# Patient Record
Sex: MALE | Race: WHITE | NOT HISPANIC OR LATINO | Employment: OTHER | URBAN - METROPOLITAN AREA
[De-identification: names, ages, dates, MRNs, and addresses within clinical notes are randomized per-mention and may not be internally consistent; named-entity substitution may affect disease eponyms.]

---

## 2017-01-20 ENCOUNTER — HOSPITAL ENCOUNTER (EMERGENCY)
Facility: CLINIC | Age: 67
Discharge: HOME | End: 2017-01-20
Attending: EMERGENCY MEDICINE | Admitting: EMERGENCY MEDICINE
Payer: MEDICARE

## 2017-01-20 ENCOUNTER — APPOINTMENT (OUTPATIENT)
Dept: CT IMAGING | Facility: CLINIC | Age: 67
End: 2017-01-20
Attending: EMERGENCY MEDICINE
Payer: MEDICARE

## 2017-01-20 VITALS
OXYGEN SATURATION: 97 % | RESPIRATION RATE: 16 BRPM | TEMPERATURE: 98.1 F | SYSTOLIC BLOOD PRESSURE: 114 MMHG | WEIGHT: 137.79 LBS | DIASTOLIC BLOOD PRESSURE: 69 MMHG

## 2017-01-20 DIAGNOSIS — G45.9 TRANSIENT CEREBRAL ISCHEMIA, UNSPECIFIED TYPE: ICD-10-CM

## 2017-01-20 LAB
ANION GAP SERPL CALCULATED.3IONS-SCNC: 8 MMOL/L (ref 3–14)
BASOPHILS # BLD AUTO: 0 10E9/L (ref 0–0.2)
BASOPHILS NFR BLD AUTO: 0.4 %
BUN SERPL-MCNC: 19 MG/DL (ref 7–30)
CALCIUM SERPL-MCNC: 9.1 MG/DL (ref 8.5–10.1)
CHLORIDE SERPL-SCNC: 105 MMOL/L (ref 94–109)
CO2 SERPL-SCNC: 28 MMOL/L (ref 20–32)
CREAT SERPL-MCNC: 0.99 MG/DL (ref 0.66–1.25)
DIFFERENTIAL METHOD BLD: NORMAL
EOSINOPHIL # BLD AUTO: 0.6 10E9/L (ref 0–0.7)
EOSINOPHIL NFR BLD AUTO: 6.3 %
ERYTHROCYTE [DISTWIDTH] IN BLOOD BY AUTOMATED COUNT: 12.3 % (ref 10–15)
GFR SERPL CREATININE-BSD FRML MDRD: 75 ML/MIN/1.7M2
GLUCOSE SERPL-MCNC: 123 MG/DL (ref 70–99)
HCT VFR BLD AUTO: 46.9 % (ref 40–53)
HGB BLD-MCNC: 15.8 G/DL (ref 13.3–17.7)
IMM GRANULOCYTES # BLD: 0 10E9/L (ref 0–0.4)
IMM GRANULOCYTES NFR BLD: 0.1 %
INTERPRETATION ECG - MUSE: NORMAL
LYMPHOCYTES # BLD AUTO: 3.2 10E9/L (ref 0.8–5.3)
LYMPHOCYTES NFR BLD AUTO: 33.6 %
MCH RBC QN AUTO: 31 PG (ref 26.5–33)
MCHC RBC AUTO-ENTMCNC: 33.7 G/DL (ref 31.5–36.5)
MCV RBC AUTO: 92 FL (ref 78–100)
MONOCYTES # BLD AUTO: 0.9 10E9/L (ref 0–1.3)
MONOCYTES NFR BLD AUTO: 9.2 %
NEUTROPHILS # BLD AUTO: 4.7 10E9/L (ref 1.6–8.3)
NEUTROPHILS NFR BLD AUTO: 50.4 %
NRBC # BLD AUTO: 0 10*3/UL
NRBC BLD AUTO-RTO: 0 /100
PLATELET # BLD AUTO: 212 10E9/L (ref 150–450)
POTASSIUM SERPL-SCNC: 3.7 MMOL/L (ref 3.4–5.3)
RBC # BLD AUTO: 5.1 10E12/L (ref 4.4–5.9)
SODIUM SERPL-SCNC: 141 MMOL/L (ref 133–144)
TROPONIN I SERPL-MCNC: NORMAL UG/L (ref 0–0.04)
WBC # BLD AUTO: 9.4 10E9/L (ref 4–11)

## 2017-01-20 PROCEDURE — 25000125 ZZHC RX 250: Performed by: EMERGENCY MEDICINE

## 2017-01-20 PROCEDURE — 70450 CT HEAD/BRAIN W/O DYE: CPT

## 2017-01-20 PROCEDURE — 70498 CT ANGIOGRAPHY NECK: CPT

## 2017-01-20 PROCEDURE — 25000128 H RX IP 250 OP 636: Performed by: EMERGENCY MEDICINE

## 2017-01-20 PROCEDURE — 96375 TX/PRO/DX INJ NEW DRUG ADDON: CPT | Mod: 59

## 2017-01-20 PROCEDURE — 25500064 ZZH RX 255 OP 636: Performed by: EMERGENCY MEDICINE

## 2017-01-20 PROCEDURE — 85025 COMPLETE CBC W/AUTO DIFF WBC: CPT | Performed by: EMERGENCY MEDICINE

## 2017-01-20 PROCEDURE — 99285 EMERGENCY DEPT VISIT HI MDM: CPT | Mod: 25

## 2017-01-20 PROCEDURE — 84484 ASSAY OF TROPONIN QUANT: CPT | Performed by: EMERGENCY MEDICINE

## 2017-01-20 PROCEDURE — 80048 BASIC METABOLIC PNL TOTAL CA: CPT | Performed by: EMERGENCY MEDICINE

## 2017-01-20 PROCEDURE — 93005 ELECTROCARDIOGRAM TRACING: CPT

## 2017-01-20 PROCEDURE — 96374 THER/PROPH/DIAG INJ IV PUSH: CPT | Mod: 59

## 2017-01-20 RX ORDER — IOPAMIDOL 755 MG/ML
500 INJECTION, SOLUTION INTRAVASCULAR ONCE
Status: COMPLETED | OUTPATIENT
Start: 2017-01-20 | End: 2017-01-20

## 2017-01-20 RX ORDER — METHYLPREDNISOLONE SODIUM SUCCINATE 125 MG/2ML
125 INJECTION, POWDER, LYOPHILIZED, FOR SOLUTION INTRAMUSCULAR; INTRAVENOUS ONCE
Status: COMPLETED | OUTPATIENT
Start: 2017-01-20 | End: 2017-01-20

## 2017-01-20 RX ORDER — DIPHENHYDRAMINE HYDROCHLORIDE 50 MG/ML
25 INJECTION INTRAMUSCULAR; INTRAVENOUS ONCE
Status: COMPLETED | OUTPATIENT
Start: 2017-01-20 | End: 2017-01-20

## 2017-01-20 RX ADMIN — METHYLPREDNISOLONE SODIUM SUCCINATE 125 MG: 125 INJECTION, POWDER, FOR SOLUTION INTRAMUSCULAR; INTRAVENOUS at 04:51

## 2017-01-20 RX ADMIN — IOPAMIDOL 70 ML: 755 INJECTION, SOLUTION INTRAVENOUS at 05:34

## 2017-01-20 RX ADMIN — DIPHENHYDRAMINE HYDROCHLORIDE 25 MG: 50 INJECTION, SOLUTION INTRAMUSCULAR; INTRAVENOUS at 04:50

## 2017-01-20 RX ADMIN — SODIUM CHLORIDE 80 ML: 9 INJECTION, SOLUTION INTRAVENOUS at 05:34

## 2017-01-20 ASSESSMENT — ENCOUNTER SYMPTOMS
NAUSEA: 1
NERVOUS/ANXIOUS: 1
WEAKNESS: 0
NUMBNESS: 0

## 2017-01-20 NOTE — ED AVS SNAPSHOT
[facility] Emergency Department    [address] [place] [address]    Phone:  [phone]    Fax:  [phone]                                       Smion Koch   MRN: [id number]    Department:  [facility] Emergency Department   Date of Visit:  1/20/2017           Patient Information     Date Of Birth          1950        Your diagnoses for this visit were:     Transient cerebral ischemia, unspecified type        You were seen by Antonio Ruelas MD.      Follow-up Information     Follow up with Aniyah Hernandez. Call today.    Specialty:  Internal Medicine    Why:  call today to discuss follow-up appointment    Contact information:    [facility]  [address] [place] [address]  [phone]          Follow up with [facility] Emergency Department.    Specialty:  EMERGENCY MEDICINE    Why:  As needed, If symptoms worsen    Contact information:    [address] [place] [address]  [phone]        Discharge Instructions         TIA: Transient Ischemic Attack    Your symptoms were caused by a TIA, also called a mini-stroke. Even though your symptoms have gone away, this condition is as serious as a full stroke. It means you are more likely to have a full stroke. About one-third of people who have a TIA go on to have a full stroke. And, 4% to 10% will have a full stroke within 2 days.  TIA is caused by a temporary decrease or blockage of blood flow to a part of your brain. A TIA often happens when a blood clot travels to a blood vessel in the brain. The clot reduces or blocks blood flow, resulting in the symptoms you had. After a short while, the clot dissolves, blood flows again, and the symptoms disappear. Persons with atherosclerosis (hardening of the arteries) or atrial fibrillation (a type of irregular heartbeat) are at higher risk of TIA.  TIA causes temporary symptoms similar to a stroke, but lasts  less than 24 hours. A full stroke causes symptoms that last more than 24 hours and may be permanent. However, even if your symptoms only lasted a short time, there may still be some damage to your brain tissue. Once you have had a TIA, you are at risk of having a full stroke. Therefore, you have received a workup to assess the blood flow to your brain and to rule out other causes of your symptoms. The workup may have included an ultrasound of the arteries in your neck, an evaluation of your heart, a CT scan, and/or an MRI scan. If problems are found, your doctor will recommend treatment with medications and/or procedures.  Medications to reduce your chance of having another TIA (and stroke) include those that prevent blood clots, such as antiplatelet medicines and anticoagulant medicines. Depending on your situation, additional treatment may be recommended, including a procedure to open up a  blockage in an artery in your neck.  Home care  The following guidelines will help you take care of yourself at home:    If your doctor has prescribed antiplatelet medication and/or medications for other conditions, such as high blood pressure, or high cholesterol, take it as directed.    Because a TIA is a serious even that means you are at risk for having a full stroke in the future, it is important to take steps to help prevent a stroke from occurring. Your doctor will consider all your risk factors in recommending management and further treatment.  Ways to reduce your risk for stroke  High blood pressure, diabetes, high cholesterol, heavy alcohol intake, and smoking are risk factors for stroke and heart disease. These can be controlled through medications, diet and lifestyle changes. Taking daily aspirin (or similar medications) is a simple but important part of preventing stroke. Do not take daily aspirin unless your doctor tells you to.  Your health care provider will work with you to make lifestyle changes to help  prevent the stroke.    Diet. Your health care provider will give you information on dietary changes that you may need to make, based on your situation. Your provider may recommend that you see a registered dietitian for help with diet changes. Changes may include:    Reducing fat and cholesterol intake    Reducing sodium (salt) intake, especially if you have high blood pressure    Eating more fresh fruits and vegetables    Eating lean proteins, such as fish, poultry, and legumes (beans and peas) and eating less red meat and processed meats    Using low-fat dairy products    Using vegetable and nut oils in limited amounts    Limiting sweets and processed foods such as chips, cookies, and baked goods    Limiting alcohol intake    Physical activity. Your health care provider may recommend that you increase your physical activity if you have not been as active as possible. Depending on your situation, your provider may recommend that you include moderate to vigorous intensity physical activity for at least 40 minutes each day for at least 3 to 4 days per week. A few examples of moderate to vigorous intensity physical activity include:    Walking at a brisk pace, about 3 to 4 miles per hour    Jogging or running    Swimming or water aerobics    Hiking    Dancing    Martial arts    Tennis    Riding a bike    Weight management. If you are overweight or obese, your health care provider will work with you to lose weight and lower your body mass index (BMI) to a  normal or near-normal levle. Making diet changes and increasing physical activity can help.    Smoking. If you smoke, break the habit. Enroll in a stop smoking program to improve your chances of success.    Stress. Learn stress management techniques to help you deal with stress in your home and work life.  Follow-up care  Call your doctor for an appointment in the next few days for another evaluation (if needed) and to make a plan for preventing a future TIA or  stroke. You may be referred to a neurologist (specialist in brain and nervous system diagnosis and care) to follow up on your TIA.. Additional tests or procedures may be needed. If you had an X-ray, CT scan, MRI scan, or ECG (electrocardiogram), it will be reviewed by a specialist. You ll be notified of any new findings that will affect your care.  When to call 911 or emergency services  Get immediate medical attention if any of the following occur:    Any of your TIA symptoms return    New problems with speech, vision, walking, or weakness or numbness of the face or on one side of the body    Severe headache, fainting spell, dizziness, or seizure    5753-7966 [employer]. [address] [place] [address], [place] [address]. All rights reserved. This information is not intended as a substitute for professional medical care. Always follow your healthcare professional's instructions.          24 Hour Appointment Hotline       To make an appointment at any [facility], call [phone] ([phone]). If you don't have a family doctor or clinic, we will help you find one. [facility] clinics are conveniently located to serve the needs of you and your family.             Review of your medicines      Our records show that you are taking the medicines listed below. If these are incorrect, please call your family doctor or clinic.        Dose / Directions Last dose taken    ALTACE PO        Refills:  0        ASPIRIN PO   Dose:  81 mg        Take 81 mg by mouth   Refills:  0        CRESTOR PO   Dose:  20 mg        Take 20 mg by mouth   Refills:  0        VITAMIN D (CHOLECALCIFEROL) PO        Take by mouth daily   Refills:  0                Procedures and tests performed during your visit     Basic metabolic panel    CBC with platelets differential    EKG 12 lead    Head CT w/o contrast    Head/neck angiogram CT  w & w/o contrast    Troponin I      Orders Needing Specimen Collection     None      Pending Results      Date and Time Order Name Status Description    1/20/2017 0438 Head/neck angiogram CT  w & w/o contrast In process     1/20/2017 0438 Head CT w/o contrast In process             Pending Culture Results     No orders found from 1/19/2017 to 1/21/2017.       Test Results from your hospital stay           1/20/2017  4:49 AM - Interface, Flexilab Results      Component Results     Component Value Ref Range & Units Status    WBC 9.4 4.0 - 11.0 10e9/L Final    RBC Count 5.10 4.4 - 5.9 10e12/L Final    Hemoglobin 15.8 13.3 - 17.7 g/dL Final    Hematocrit 46.9 40.0 - 53.0 % Final    MCV 92 78 - 100 fl Final    MCH 31.0 26.5 - 33.0 pg Final    MCHC 33.7 31.5 - 36.5 g/dL Final    RDW 12.3 10.0 - 15.0 % Final    Platelet Count 212 150 - 450 10e9/L Final    Diff Method Automated Method  Final    % Neutrophils 50.4 % Final    % Lymphocytes 33.6 % Final    % Monocytes 9.2 % Final    % Eosinophils 6.3 % Final    % Basophils 0.4 % Final    % Immature Granulocytes 0.1 % Final    Nucleated RBCs 0 0 /100 Final    Absolute Neutrophil 4.7 1.6 - 8.3 10e9/L Final    Absolute Lymphocytes 3.2 0.8 - 5.3 10e9/L Final    Absolute Monocytes 0.9 0.0 - 1.3 10e9/L Final    Absolute Eosinophils 0.6 0.0 - 0.7 10e9/L Final    Absolute Basophils 0.0 0.0 - 0.2 10e9/L Final    Abs Immature Granulocytes 0.0 0 - 0.4 10e9/L Final    Absolute Nucleated RBC 0.0  Final         1/20/2017  5:06 AM - Interface, Flexilab Results      Component Results     Component Value Ref Range & Units Status    Sodium 141 133 - 144 mmol/L Final    Potassium 3.7 3.4 - 5.3 mmol/L Final    Chloride 105 94 - 109 mmol/L Final    Carbon Dioxide 28 20 - 32 mmol/L Final    Anion Gap 8 3 - 14 mmol/L Final    Glucose 123 (H) 70 - 99 mg/dL Final    Urea Nitrogen 19 7 - 30 mg/dL Final    Creatinine 0.99 0.66 - 1.25 mg/dL Final    GFR Estimate 75 >60 mL/min/1.7m2 Final    Non  GFR Calc    GFR Estimate If Black >90   GFR Calc   >60 mL/min/1.7m2 Final     Calcium 9.1 8.5 - 10.1 mg/dL Final         1/20/2017  5:14 AM - Muna Monaco         1/20/2017  5:14 AM - OneliaMuna reeves         1/20/2017  5:06 AM - Rosanne, Flexilab Results      Component Results     Component Value Ref Range & Units Status    Troponin I ES  0.000 - 0.045 ug/L Final    <0.015  The 99th percentile for upper reference range is 0.045 ug/L.  Troponin values in   the range of 0.045 - 0.120 ug/L may be associated with risks of adverse   clinical events.                  Clinical Quality Measure: Blood Pressure Screening     Your blood pressure was checked while you were in the emergency department today. The last reading we obtained was  BP: 114/69 mmHg . Please read the guidelines below about what these numbers mean and what you should do about them.  If your systolic blood pressure (the top number) is less than 120 and your diastolic blood pressure (the bottom number) is less than 80, then your blood pressure is normal. There is nothing more that you need to do about it.  If your systolic blood pressure (the top number) is 120-139 or your diastolic blood pressure (the bottom number) is 80-89, your blood pressure may be higher than it should be. You should have your blood pressure rechecked within a year by a primary care provider.  If your systolic blood pressure (the top number) is 140 or greater or your diastolic blood pressure (the bottom number) is 90 or greater, you may have high blood pressure. High blood pressure is treatable, but if left untreated over time it can put you at risk for heart attack, stroke, or kidney failure. You should have your blood pressure rechecked by a primary care provider within the next 4 weeks.  If your provider in the emergency department today gave you specific instructions to follow-up with your doctor or provider even sooner than that, you should follow that instruction and not wait for up to 4 weeks for your follow-up visit.        Thank you  "for choosing [facility]       Thank you for choosing [facility] for your care. Our goal is always to provide you with excellent care. Hearing back from our patients is one way we can continue to improve our services. Please take a few minutes to complete the written survey that you may receive in the mail after you visit with us. Thank you!        [employer] Information     [facility] lets you send messages to your doctor, view your test results, renew your prescriptions, schedule appointments and more. To sign up, go to [email or web address] . Click on \"Log in\" on the left side of the screen, which will take you to the Welcome page. Then click on \"Sign up Now\" on the right side of the page.     You will be asked to enter the access code listed below, as well as some personal information. Please follow the directions to create your username and password.     Your access code is: [email or web address]  Expires: 2017  6:55 AM     Your access code will  in 90 days. If you need help or a new code, please call your [facility] clinic or [phone].        Care EveryWhere ID     This is your Care EveryWhere ID. This could be used by other organizations to access your [facility] medical records  [id number]        After Visit Summary       This is your record. Keep this with you and show to your community pharmacist(s) and doctor(s) at your next visit.                  "

## 2017-01-20 NOTE — DISCHARGE INSTRUCTIONS
TIA: Transient Ischemic Attack    Your symptoms were caused by a TIA, also called a mini-stroke. Even though your symptoms have gone away, this condition is as serious as a full stroke. It means you are more likely to have a full stroke. About one-third of people who have a TIA go on to have a full stroke. And, 4% to 10% will have a full stroke within 2 days.  TIA is caused by a temporary decrease or blockage of blood flow to a part of your brain. A TIA often happens when a blood clot travels to a blood vessel in the brain. The clot reduces or blocks blood flow, resulting in the symptoms you had. After a short while, the clot dissolves, blood flows again, and the symptoms disappear. Persons with atherosclerosis (hardening of the arteries) or atrial fibrillation (a type of irregular heartbeat) are at higher risk of TIA.  TIA causes temporary symptoms similar to a stroke, but lasts less than 24 hours. A full stroke causes symptoms that last more than 24 hours and may be permanent. However, even if your symptoms only lasted a short time, there may still be some damage to your brain tissue. Once you have had a TIA, you are at risk of having a full stroke. Therefore, you have received a workup to assess the blood flow to your brain and to rule out other causes of your symptoms. The workup may have included an ultrasound of the arteries in your neck, an evaluation of your heart, a CT scan, and/or an MRI scan. If problems are found, your doctor will recommend treatment with medications and/or procedures.  Medications to reduce your chance of having another TIA (and stroke) include those that prevent blood clots, such as antiplatelet medicines and anticoagulant medicines. Depending on your situation, additional treatment may be recommended, including a procedure to open up a  blockage in an artery in your neck.  Home care  The following guidelines will help you take care of yourself at home:    If your doctor has  prescribed antiplatelet medication and/or medications for other conditions, such as high blood pressure, or high cholesterol, take it as directed.    Because a TIA is a serious even that means you are at risk for having a full stroke in the future, it is important to take steps to help prevent a stroke from occurring. Your doctor will consider all your risk factors in recommending management and further treatment.  Ways to reduce your risk for stroke  High blood pressure, diabetes, high cholesterol, heavy alcohol intake, and smoking are risk factors for stroke and heart disease. These can be controlled through medications, diet and lifestyle changes. Taking daily aspirin (or similar medications) is a simple but important part of preventing stroke. Do not take daily aspirin unless your doctor tells you to.  Your health care provider will work with you to make lifestyle changes to help prevent the stroke.    Diet. Your health care provider will give you information on dietary changes that you may need to make, based on your situation. Your provider may recommend that you see a registered dietitian for help with diet changes. Changes may include:    Reducing fat and cholesterol intake    Reducing sodium (salt) intake, especially if you have high blood pressure    Eating more fresh fruits and vegetables    Eating lean proteins, such as fish, poultry, and legumes (beans and peas) and eating less red meat and processed meats    Using low-fat dairy products    Using vegetable and nut oils in limited amounts    Limiting sweets and processed foods such as chips, cookies, and baked goods    Limiting alcohol intake    Physical activity. Your health care provider may recommend that you increase your physical activity if you have not been as active as possible. Depending on your situation, your provider may recommend that you include moderate to vigorous intensity physical activity for at least 40 minutes each day for at least 3  to 4 days per week. A few examples of moderate to vigorous intensity physical activity include:    Walking at a brisk pace, about 3 to 4 miles per hour    Jogging or running    Swimming or water aerobics    Hiking    Dancing    Martial arts    Tennis    Riding a bike    Weight management. If you are overweight or obese, your health care provider will work with you to lose weight and lower your body mass index (BMI) to a  normal or near-normal levle. Making diet changes and increasing physical activity can help.    Smoking. If you smoke, break the habit. Enroll in a stop smoking program to improve your chances of success.    Stress. Learn stress management techniques to help you deal with stress in your home and work life.  Follow-up care  Call your doctor for an appointment in the next few days for another evaluation (if needed) and to make a plan for preventing a future TIA or stroke. You may be referred to a neurologist (specialist in brain and nervous system diagnosis and care) to follow up on your TIA.. Additional tests or procedures may be needed. If you had an X-ray, CT scan, MRI scan, or ECG (electrocardiogram), it will be reviewed by a specialist. You ll be notified of any new findings that will affect your care.  When to call 911 or emergency services  Get immediate medical attention if any of the following occur:    Any of your TIA symptoms return    New problems with speech, vision, walking, or weakness or numbness of the face or on one side of the body    Severe headache, fainting spell, dizziness, or seizure    2487-0286 [employer]. [address] [place] [address], [place] [address]. All rights reserved. This information is not intended as a substitute for professional medical care. Always follow your healthcare professional's instructions.

## 2017-01-20 NOTE — ED AVS SNAPSHOT
[facility] Emergency Department    [address]    [facility] [address]    Phone:  [phone]    Fax:  [phone]                                       Simon Koch   MRN: [id number]    Department:  [facility] Emergency Department   Date of Visit:  1/20/2017           After Visit Summary Signature Page     I have received my discharge instructions, and my questions have been answered. I have discussed any challenges I see with this plan with the nurse or doctor.    ..........................................................................................................................................  Patient/Patient Representative Signature      ..........................................................................................................................................  Patient Representative Print Name and Relationship to Patient    ..................................................               ................................................  Date                                            Time    ..........................................................................................................................................  Reviewed by Signature/Title    ...................................................              ..............................................  Date                                                            Time

## 2017-01-20 NOTE — ED NOTES
Alert and oriented x 3 airway,breathing and circulation intact, got up one houjr ago to go to the BR and felt himself leaning to the rt

## 2017-01-20 NOTE — LETTER
[facility] EMERGENCY DEPARTMENT  [address]  [facility] [address]  [phone]    2017    Simon Koch  [address] [facility] [address]  [phone] (home) none (work)    : 1950      To Whom it may concern:    Simon Koch was seen in our Emergency Department today, 2017.  We recommend that patient not fly today. Please allow cancellation of his and his wife's flight.     Sincerely,        Antonio Ruelas MD

## 2017-01-20 NOTE — ED PROVIDER NOTES
History     Chief Complaint:  Gait instabililty    HPI   Simon Koch is a 66 year old male with a history of bradycardia who presents with spouse to the emergency department today for evaluation of gait instability. Mr. Koch reports at 03:15am, an hour prior, he attempted to walk to the bathroom when he began to lean to the right side with multiple episodes lasting 10 minutes. Last known normal is 10:30. Patient began to feel anxious and nauseas. He was worried and came in to be evaluated. Here, he reports improvement in symptoms, but continues to feel anxious. Patient is an avid runner and ran a marathon last year. No reported shortness of breath. Patient is scheduled to go to [place] later today.     CARDIAC RISK FACTORS  SEX:                  Male  Tobacco:             Negative  Hypertension:        Positive  Diabetes:             Negative  Lipids:                Positive  Personal History:  Negative  Family History:       Negative    Allergies:  Percocet  Vicodin  Penicillins  Dilaudid   Iodine    Medications:    Crestor  Vitamin D  Ramipril  Aspirin        Past Medical History:    Allergic rhinitis   External hemorrhoid   Vitamin D deficiency   Plantar fasciitis   Coronary atherosclerosis of native coronary artery    Benign neoplasm of colon   Essential hypertension  Bradycardia   Mixed hyperlipidemia     Past Surgical History:    Surgical history reviewed. No pertinent surgical history.     Family History:    History reviewed. No pertinent family history.    Social History:  Marital Status:   [2]  Tobacco: Negative  Alcohol: Negative  Presents with spouse.     Review of Systems   Gastrointestinal: Positive for nausea.   Neurological: Negative for weakness and numbness.        Positive for gait instability.    Psychiatric/Behavioral: The patient is nervous/anxious.    All other systems reviewed and are negative.    Physical Exam   First Vitals:  BP: 156/88 mmHg  Heart Rate: 68  Temp: 98.1  F (36.7   C)  Resp: 18  Weight: 62.5 kg (137 lb 12.6 oz)  SpO2: 99 %    Physical Exam   Nursing note and vitals reviewed.  Constitutional: Cooperative.   HENT:   Mouth/Throat: Moist mucous membranes.   Eyes: EOMI, nonicteric sclera  Cardiovascular: Normal rate, regular rhythm, no murmurs, rubs, or gallops  Pulmonary/Chest: Effort normal and breath sounds normal. No respiratory distress. No wheezes. No rales.   Abdominal: Soft. Nontender, nondistended, no guarding or rigidity. BS present.   Musculoskeletal: Normal range of motion.   Neurological: Alert. Moves all extremities spontaneously.     CN's II-XII intact. 5/5 BUE and BLE strength. PERRL    EOMI without nystagmus.      Sensation intact to light touch. Negative pronator drift.    Finger to nose intact. Negative Romberg. Normal gait.     Skin: Skin is warm and dry. No rash noted.   Psychiatric: Normal mood and affect.     Emergency Department Course   EC  Indication: rule out cardiac etiology  Findings:    Normal sinus rhythm. Normal ECG.   Ventricular rate: 63 bpm  DE interval: 190 ms  QRS duration: 96 ms  QT/QTc: 436/446 ms  R-Axis: 36  ECG read by Dr. Ruelas.    Imaging:  Radiographic findings were communicated with the patient and family who voiced understanding of the findings.  Head CT, without contrast, per radiology:   Normal CT scan of the head.    Head/Neck CTA, with and without contrast, per radiology:   1. No arterial occlusion or thrombus.  2. Mild stenosis distal petrous segment of the right internal carotid artery.  3. Minimal calcified plaque proximal left internal carotid artery.    Laboratory:  CBC:  WBC 9.4, HGB 15.8, , otherwise WNL  BMP: Glucose 123 (H) o/w WNL. (Creatinine 0.99)  Troponin collected at 0429: <0.015 (WNL)          Interventions:  0450 Benadryl 25 mg IV  0451 Solu-Medrol 125mg IV    Emergency Department Course:  Nursing notes and vitals reviewed. Code stroke called.   04:18 I performed an exam of the patient as  documented above.   The above workup was undertaken.  I rechecked the patient and discussed results.  Findings and plan explained to the Patient and spouse. Patient discharged home with instructions regarding supportive care, medications, and reasons to return. The importance of close follow-up was reviewed.     Impression & Plan      CMS Diagnoses: The patient has stroke symptoms:           ED Stroke specific documentation           NIHSS PDF          Protocol PDF     Patient last known well time: 10:30  ED Provider first to bedside at: 04:18    Patient was not treated with TPA due to the following reason(s):  Out of the treatment time window  Mild stroke symptoms ( NIHSS < 4 and not globally aphasic)  Isolated mild neurological deficits such as ataxia alone, sensory loss alone, dysarthria alone or minimal weakness ( NIHSS < 4 AND normal language AND visual fields )  Rapidly improving symptoms    National Institutes of Health Stroke Scale (Baseline)  Time Performed: 0420      Score    Level of consciousness: (0)   Alert, keenly responsive    LOC questions: (0)   Answers both questions correctly    LOC commands: (0)   Performs both tasks correctly    Best gaze: (0)   Normal    Visual: (0)   No visual loss    Facial palsy: (0)   Normal symmetrical movements    Motor arm (left): (0)   No drift    Motor arm (right): (0)   No drift    Motor leg (left): (0)   No drift    Motor leg (right): (0)   No drift    Limb ataxia: (0)   Absent    Sensory: (0)   Normal- no sensory loss    Best language: (0)   Normal- no aphasia    Dysarthria: (0)   Normal    Extinction and inattention: (0)   No abnormality        Total Score:  0        Stroke Mimics were considered (including migraine headache, seizure disorder, hypoglycemia (or hyperglycemia), head or spinal trauma, CNS infection, Toxin ingestion and shock state (e.g. sepsis) .    Medical Decision Making:  Simon Koch is a 66 year old male who presents with complaint of some  mild ataxia at home. This has resolved before arrival here. The remainder of the patient's neurologic exam is completely normal and without abnormality. The patient was ambulated multiple times here in the ED and no ataxia was observed. The patient's labs were unremarkable. EKG was normal. CT and CTA of the head and neck also appear normal. Uncertain etiology of patient's complaints at this time, however, I suspect that this represented a mild TIA. By ABCD 2 Score, the patient is low risk, which I explained to them at bedside. I recommended outpatient follow up with his PCP for further outpatient stroke evaluation. The patient stated that he is supposed to go out of town to [place] today, and while I think he would more than likely be okay, I did recommend against going out of town, given the possibility that this had represented a TIA. I had an extensive discussion with the patient and his wife at bedside concerning the course of the next few days and reasons to return to the ED. I doubt other etiology of ataxia given normal imaging and labs. There is no alcohol on board. The patient never had symptoms like this in the past. He is in stable condition at the time of discharge, indications for return to the ED were discussed as well as follow up. All questions were answered and He is in agreement with the plan.    Diagnosis:    ICD-10-CM   1. Transient cerebral ischemia, unspecified type G45.9     Disposition:  Discharge to home with primary care follow up.         Scribe Disclosure:  Marcela ROBLES, am serving as a scribe at 4:18 AM on 1/20/2017 to document services personally performed by Antonio Ruelas MD, based on my observations and the provider's statements to me.  Marcela Rivers  1/20/2017   [facility] EMERGENCY DEPARTMENT        Antonio Ruelas MD  01/25/17 0820

## 2017-10-07 ENCOUNTER — HOSPITAL ENCOUNTER (EMERGENCY)
Facility: CLINIC | Age: 67
Discharge: HOME | End: 2017-10-07
Attending: EMERGENCY MEDICINE | Admitting: EMERGENCY MEDICINE
Payer: MEDICARE

## 2017-10-07 VITALS
DIASTOLIC BLOOD PRESSURE: 80 MMHG | WEIGHT: 135 LBS | TEMPERATURE: 97.6 F | BODY MASS INDEX: 21.69 KG/M2 | OXYGEN SATURATION: 100 % | HEIGHT: 66 IN | RESPIRATION RATE: 16 BRPM | SYSTOLIC BLOOD PRESSURE: 118 MMHG | HEART RATE: 59 BPM

## 2017-10-07 DIAGNOSIS — H81.12 BENIGN PAROXYSMAL POSITIONAL VERTIGO OF LEFT EAR: ICD-10-CM

## 2017-10-07 PROCEDURE — 25000132 ZZH RX MED GY IP 250 OP 250 PS 637: Mod: GY | Performed by: EMERGENCY MEDICINE

## 2017-10-07 PROCEDURE — 93005 ELECTROCARDIOGRAM TRACING: CPT

## 2017-10-07 PROCEDURE — 25000131 ZZH RX MED GY IP 250 OP 636 PS 637: Performed by: EMERGENCY MEDICINE

## 2017-10-07 PROCEDURE — 25000125 ZZHC RX 250: Performed by: EMERGENCY MEDICINE

## 2017-10-07 PROCEDURE — A9270 NON-COVERED ITEM OR SERVICE: HCPCS | Mod: GY | Performed by: EMERGENCY MEDICINE

## 2017-10-07 PROCEDURE — 99283 EMERGENCY DEPT VISIT LOW MDM: CPT

## 2017-10-07 RX ORDER — LORAZEPAM 0.5 MG/1
0.5 TABLET ORAL ONCE
Status: COMPLETED | OUTPATIENT
Start: 2017-10-07 | End: 2017-10-07

## 2017-10-07 RX ORDER — MECLIZINE HYDROCHLORIDE 25 MG/1
50 TABLET ORAL ONCE
Status: COMPLETED | OUTPATIENT
Start: 2017-10-07 | End: 2017-10-07

## 2017-10-07 RX ORDER — LORAZEPAM 1 MG/1
0.5 TABLET ORAL EVERY 6 HOURS PRN
Qty: 10 TABLET | Refills: 0 | Status: SHIPPED | OUTPATIENT
Start: 2017-10-07

## 2017-10-07 RX ORDER — MECLIZINE HYDROCHLORIDE 25 MG/1
25-50 TABLET ORAL 3 TIMES DAILY PRN
Qty: 30 TABLET | Refills: 1 | Status: SHIPPED | OUTPATIENT
Start: 2017-10-07

## 2017-10-07 RX ORDER — ONDANSETRON 4 MG/1
4 TABLET, ORALLY DISINTEGRATING ORAL EVERY 8 HOURS PRN
Qty: 10 TABLET | Refills: 0 | Status: SHIPPED | OUTPATIENT
Start: 2017-10-07 | End: 2017-10-10

## 2017-10-07 RX ORDER — ONDANSETRON 4 MG/1
4 TABLET, ORALLY DISINTEGRATING ORAL ONCE
Status: COMPLETED | OUTPATIENT
Start: 2017-10-07 | End: 2017-10-07

## 2017-10-07 RX ADMIN — MECLIZINE HYDROCHLORIDE 50 MG: 25 TABLET ORAL at 05:06

## 2017-10-07 RX ADMIN — LORAZEPAM 0.5 MG: 0.5 TABLET ORAL at 05:43

## 2017-10-07 RX ADMIN — ONDANSETRON 4 MG: 4 TABLET, ORALLY DISINTEGRATING ORAL at 05:41

## 2017-10-07 ASSESSMENT — ENCOUNTER SYMPTOMS
NECK PAIN: 0
WEAKNESS: 0
HEADACHES: 0
NAUSEA: 1
NUMBNESS: 0
DIZZINESS: 1
VOMITING: 1

## 2017-10-07 NOTE — ED AVS SNAPSHOT
[facility] Emergency Department    [address]    [facility] [address]    Phone:  [phone]    Fax:  [phone]                                       Simon Koch   MRN: [id number]    Department:  [facility] Emergency Department   Date of Visit:  10/7/2017           After Visit Summary Signature Page     I have received my discharge instructions, and my questions have been answered. I have discussed any challenges I see with this plan with the nurse or doctor.    ..........................................................................................................................................  Patient/Patient Representative Signature      ..........................................................................................................................................  Patient Representative Print Name and Relationship to Patient    ..................................................               ................................................  Date                                            Time    ..........................................................................................................................................  Reviewed by Signature/Title    ...................................................              ..............................................  Date                                                            Time

## 2017-10-07 NOTE — DISCHARGE INSTRUCTIONS
Please make an appointment to follow up with [facility] [phone] in 2-3 days if not improving.    Contact [facility] as this can also help the vertigo resolve faster        Benign Paroxysmal Positional Vertigo     Your health care provider may move your head in certain ways to treat your BPPV.     Benign paroxysmal positional vertigo (BPPV) is a problem with the inner ear. The inner ear contains the vestibular system. This system is what helps you keep your balance. BPPV causes a feeling of spinning. It is a common problem of the vestibular system.  Understanding the vestibular system  The vestibular system of the ear is made up of very tiny parts. They include the utricle, saccule, and semicircular canals. The utricle is a tiny organ that contains calcium crystals. In some people, the crystals can move into the semicircular canals. When this happens, the system no longer works as it should. This causes BPPV. Benign means it is not life-threatening. Paroxysmal means it happens suddenly. Positional means that it happens when you move your head. Vertigo is a feeling of spinning.  What causes BPPV?  Causes include injury to your head or neck. Other problems with the vestibular system may cause BPPV. In many people, the cause of BPPV is not known.  Symptoms of BPPV  You many have repeated feelings of spinning (vertigo). The vertigo usually lasts less than 1 minute. Some movements, suchas rolling over in bed, can bring on vertigo.  Diagnosing BPPV  Your primary health care provider may diagnose and treat your BPPV. Or you may see an ear, nose, and throat doctor (otolaryngologist). In some cases, you may see a nervous system doctor (neurologist).  The health care provider will ask about your symptoms and your medical history. He or she will examine you. You may have hearing and balance tests. As part of the exam, your health care provider may have you move your head and body in  certain ways. If you have BPPV, the movements can bring on vertigo. Your provider will also look for abnormal movements of your eyes. You may have other tests to check your vestibular or nervous systems.  Treatment for BPPV  Your health care provider may try to move the calcium crystals. This is done by having you move your head and neck in certain ways. This treatment is safe and often works well. You may also be told to do these movements at home. You may still have vertigo for a few weeks. Your health care provider will recheck your symptoms, usually in about a month. Special physical therapy may also be part of treatment. In rare cases surgery may be needed for BPPV that does not go away.     When to call the health care provider  Call your health care provider right away if you have any of these:    Symptoms that do not go away with treatment    Symptoms that get worse    New symptoms   Date Last Reviewed: 3/19/2015    2621-8515 [employer] [facility]. [address] [place] [address]. All rights reserved. This information is not intended as a substitute for professional medical care. Always follow your healthcare professional's instructions.            Meclizine tablets or capsules  What is this medicine?  MECLIZINE (MEK li zeen) is an antihistamine. It is used to prevent nausea, vomiting, or dizziness caused by motion sickness. It is also used to prevent and treat vertigo (extreme dizziness or a feeling that you or your surroundings are tilting or spinning around).  How should I use this medicine?  Take this medicine by mouth with a glass of water. Follow the directions on the prescription label. If you are using this medicine to prevent motion sickness, take the dose at least 1 hour before travel. If it upsets your stomach, take it with food or milk. Take your doses at regular intervals. Do not take your medicine more often than directed.  Talk to your pediatrician regarding the use of this  medicine in children. Special care may be needed.  What side effects may I notice from receiving this medicine?  Side effects that you should report to your doctor or health care professional as soon as possible:    feeling faint or lightheaded, falls    fast, irregular heartbeat  Side effects that usually do not require medical attention (report these to your doctor or health care professional if they continue or are bothersome):    constipation    headache    trouble passing urine or change in the amount of urine    trouble sleeping    upset stomach  What may interact with this medicine?  Do not take this medicine with any of the following medications:    MAOIs like Carbex, Eldepryl, Marplan, Nardil, and Parnate  This medicine may also interact with the following medications:    alcohol    antihistamines for allergy, cough and cold    certain medicines for anxiety or sleep    certain medicines for depression, like amitriptyline, fluoxetine, sertraline    certain medicines for seizures like phenobarbital, primidone    general anesthetics like halothane, isoflurane, methoxyflurane, propofol    local anesthetics like lidocaine, pramoxine, tetracaine    medicines that relax muscles for surgery    narcotic medicines for pain    phenothiazines like chlorpromazine, mesoridazine, prochlorperazine, thioridazine  What if I miss a dose?  If you miss a dose, take it as soon as you can. If it is almost time for your next dose, take only that dose. Do not take double or extra doses.  Where should I keep my medicine?  Keep out of the reach of children.  Store at room temperature between 15 and 30 degrees C (59 and 86 degrees F). Keep container tightly closed. Throw away any unused medicine after the expiration date.  What should I tell my health care provider before I take this medicine?  They need to know if you have any of these conditions:    glaucoma    lung or breathing disease, like asthma    problems urinating    prostate  disease    stomach or intestine problems    an unusual or allergic reaction to meclizine, other medicines, foods, dyes, or preservatives    pregnant or trying to get pregnant    breast-feeding  What should I watch for while using this medicine?  Tell your doctor or healthcare professional if your symptoms do not start to get better or if they get worse.  You may get drowsy or dizzy. Do not drive, use machinery, or do anything that needs mental alertness until you know how this medicine affects you. Do not stand or sit up quickly, especially if you are an older patient. This reduces the risk of dizzy or fainting spells. Alcohol may interfere with the effect of this medicine. Avoid alcoholic drinks.  Your mouth may get dry. Chewing sugarless gum or sucking hard candy, and drinking plenty of water may help. Contact your doctor if the problem does not go away or is severe.  This medicine may cause dry eyes and blurred vision. If you wear contact lenses you may feel some discomfort. Lubricating drops may help. See your eye doctor if the problem does not go away or is severe.  NOTE:This sheet is a summary. It may not cover all possible information. If you have questions about this medicine, talk to your doctor, pharmacist, or health care provider. Copyright  2017 [employer]         Patient Education    Dextrose, Ondansetron Hydrochloride Solution for injection    Ondansetron Hydrochloride Oral solution    Ondansetron Hydrochloride Oral tablet    Ondansetron Hydrochloride Solution for injection    Ondansetron Hydrochloride, Sodium Chloride Solution for injection    Ondansetron Oral disintegrating tablet    Ondansetron Oral dissolving film  Ondansetron Oral disintegrating tablet  What is this medicine?  ONDANSETRON (on DAN se zainab) is used to treat nausea and vomiting caused by chemotherapy. It is also used to prevent or treat nausea and vomiting after surgery.  This medicine may be used for other purposes; ask your  health care provider or pharmacist if you have questions.  What should I tell my health care provider before I take this medicine?  They need to know if you have any of these conditions:    heart disease    history of irregular heartbeat    liver disease    low levels of magnesium or potassium in the blood    an unusual or allergic reaction to ondansetron, granisetron, other medicines, foods, dyes, or preservatives    pregnant or trying to get pregnant    breast-feeding  How should I use this medicine?  These tablets are made to dissolve in the mouth. Do not try to push the tablet through the foil backing. With dry hands, peel away the foil backing and gently remove the tablet. Place the tablet in the mouth and allow it to dissolve, then swallow. While you may take these tablets with water, it is not necessary to do so.  Talk to your pediatrician regarding the use of this medicine in children. Special care may be needed.  Overdosage: If you think you have taken too much of this medicine contact a poison control center or emergency room at once.  NOTE: This medicine is only for you. Do not share this medicine with others.  What if I miss a dose?  If you miss a dose, take it as soon as you can. If it is almost time for your next dose, take only that dose. Do not take double or extra doses.  What may interact with this medicine?  Do not take this medicine with any of the following medications:    apomorphine    certain medicines for fungal infections like fluconazole, itraconazole, ketoconazole, posaconazole, voriconazole    cisapride    dofetilide    dronedarone    pimozide    thioridazine    ziprasidone  This medicine may also interact with the following medications:    carbamazepine    certain medicines for depression, anxiety, or psychotic disturbances    fentanyl    linezolid    MAOIs like Carbex, Eldepryl, Marplan, Nardil, and Parnate    methylene blue (injected into a vein)    other medicines that prolong the QT  interval (cause an abnormal heart rhythm)    phenytoin    rifampicin    tramadol  This list may not describe all possible interactions. Give your health care provider a list of all the medicines, herbs, non-prescription drugs, or dietary supplements you use. Also tell them if you smoke, drink alcohol, or use illegal drugs. Some items may interact with your medicine.  What should I watch for while using this medicine?  Check with your doctor or health care professional as soon as you can if you have any sign of an allergic reaction.  What side effects may I notice from receiving this medicine?  Side effects that you should report to your doctor or health care professional as soon as possible:    allergic reactions like skin rash, itching or hives, swelling of the face, lips, or tongue    breathing problems    confusion    dizziness    fast or irregular heartbeat    feeling faint or lightheaded, falls    fever and chills    loss of balance or coordination    seizures    sweating    swelling of the hands and feet    tightness in the chest    tremors    unusually weak or tired  Side effects that usually do not require medical attention (report to your doctor or health care professional if they continue or are bothersome):    constipation or diarrhea    headache  This list may not describe all possible side effects. Call your doctor for medical advice about side effects. You may report side effects to FDA at 6-277-FDA-0310.  Where should I keep my medicine?  Keep out of the reach of children.  Store between 2 and 30 degrees C (36 and 86 degrees F). Throw away any unused medicine after the expiration date.  NOTE:This sheet is a summary. It may not cover all possible information. If you have questions about this medicine, talk to your doctor, pharmacist, or health care provider. Copyright  2016 [employer]         Patient Education    Lorazepam Oral solution    Lorazepam Oral tablet    Lorazepam Solution for  injection  Lorazepam Oral tablet  What is this medicine?  LORAZEPAM (hannah A ze adolph) is a benzodiazepine. It is used to treat anxiety.  This medicine may be used for other purposes; ask your health care provider or pharmacist if you have questions.  What should I tell my health care provider before I take this medicine?  They need to know if you have any of these conditions:    alcohol or drug abuse problem    bipolar disorder, depression, psychosis or other mental health condition    glaucoma    kidney or liver disease    lung disease or breathing difficulties    myasthenia gravis    Parkinson's disease    seizures or a history of seizures    suicidal thoughts    an unusual or allergic reaction to lorazepam, other benzodiazepines, foods, dyes, or preservatives    pregnant or trying to get pregnant    breast-feeding  How should I use this medicine?  Take this medicine by mouth with a glass of water. Follow the directions on the prescription label. If it upsets your stomach, take it with food or milk. Take your medicine at regular intervals. Do not take it more often than directed. Do not stop taking except on the advice of your doctor or health care professional.  Talk to your pediatrician regarding the use of this medicine in children. Special care may be needed.  Overdosage: If you think you have taken too much of this medicine contact a poison control center or emergency room at once.  NOTE: This medicine is only for you. Do not share this medicine with others.  What if I miss a dose?  If you miss a dose, take it as soon as you can. If it is almost time for your next dose, take only that dose. Do not take double or extra doses.  What may interact with this medicine?    barbiturate medicines for inducing sleep or treating seizures, like phenobarbital    clozapine    medicines for depression, mental problems or psychiatric disturbances    medicines for sleep    phenytoin    probenecid    theophylline    valproic  acid  This list may not describe all possible interactions. Give your health care provider a list of all the medicines, herbs, non-prescription drugs, or dietary supplements you use. Also tell them if you smoke, drink alcohol, or use illegal drugs. Some items may interact with your medicine.  What should I watch for while using this medicine?  Visit your doctor or health care professional for regular checks on your progress. Your body may become dependent on this medicine, ask your doctor or health care professional if you still need to take it. However, if you have been taking this medicine regularly for some time, do not suddenly stop taking it. You must gradually reduce the dose or you may get severe side effects. Ask your doctor or health care professional for advice before increasing or decreasing the dose. Even after you stop taking this medicine it can still affect your body for several days.  You may get drowsy or dizzy. Do not drive, use machinery, or do anything that needs mental alertness until you know how this medicine affects you. To reduce the risk of dizzy and fainting spells, do not stand or sit up quickly, especially if you are an older patient. Alcohol may increase dizziness and drowsiness. Avoid alcoholic drinks.  Do not treat yourself for coughs, colds or allergies without asking your doctor or health care professional for advice. Some ingredients can increase possible side effects.  What side effects may I notice from receiving this medicine?  Side effects that you should report to your doctor or health care professional as soon as possible:    changes in vision    confusion    depression    mood changes, excitability or aggressive behavior    movement difficulty, staggering or jerky movements    muscle cramps    restlessness    weakness or tiredness  Side effects that usually do not require medical attention (report to your doctor or health care professional if they continue or are  bothersome):    constipation or diarrhea    difficulty sleeping, nightmares    dizziness, drowsiness    headache    nausea, vomiting  This list may not describe all possible side effects. Call your doctor for medical advice about side effects. You may report side effects to FDA at 5-679-BUN-2201.  Where should I keep my medicine?  Keep out of the reach of children. This medicine can be abused. Keep your medicine in a safe place to protect it from theft. Do not share this medicine with anyone. Selling or giving away this medicine is dangerous and against the law.  Store at room temperature between 20 and 25 degrees C (68 and 77 degrees F). Protect from light. Keep container tightly closed. Throw away any unused medicine after the expiration date.  NOTE:This sheet is a summary. It may not cover all possible information. If you have questions about this medicine, talk to your doctor, pharmacist, or health care provider. Copyright  2016 [employer] Standard

## 2017-10-07 NOTE — ED PROVIDER NOTES
"  History     Chief Complaint:  Dizziness    The history is provided by the patient.      Simon Koch is a 67 year old male who presents with dizziness. The patient woke up this morning and turned his head on his pillow to look at his bedside clock. His left eye was closed and in his pillow, and he looked with his right eye and noted that the numbers on the clock were \"bouncing like a basketball.\" He closed his right eye and checked with his left eye and noted no disturbance. He shut both his eyes tightly and reopened them and his vision was normal. He noted some nausea associated with the dizziness. Of note, the patient states that he has had ringing in his left ear for the past 2-3 days. He denies any numbness, weakness, tingling, headaches, neck pain, or other medical concerns. The patient vomited post Perry-Hallpike maneuver.    Allergies:  Dilaudid [Hydromorphone]  Iodine  Penicillins  Percocet [Oxycodone-Acetaminophen]  Tetracyclines  Vicodin [Hydrocodone-Acetaminophen]      Medications:    Crestor  Altace    Past Medical History:    Allergic state    Past Surgical History:    Appendectomy  ENT Surgery  Orthopedic surgery    Family History:    History reviewed. No pertinent family history.      Social History:  Presents with spouse   Tobacco use: Never  Alcohol use: Yes  PCP: Aniyah Hernandez    Marital Status:        Review of Systems   Gastrointestinal: Positive for nausea and vomiting.   Musculoskeletal: Negative for neck pain.   Neurological: Positive for dizziness. Negative for weakness, numbness and headaches.   All other systems reviewed and are negative.    Physical Exam     Patient Vitals for the past 24 hrs:   BP Temp Temp src Pulse Resp SpO2 Height Weight   10/07/17 0509 145/79 97.6  F (36.4  C) Oral 69 16 97 % 1.676 m (5' 6\") 61.2 kg (135 lb)      Physical Exam    HEENT:  PERRL, measuring 4mm bilaterally, EOMI, visual acuity and fields intact, conjunctiva and lids normal, external ears " unremarkable, Nares clear bilaterally, mmm, oropharynx without tonsillar hypertrophy/erythema/exudate    Neck: supple, painless ROM, no cervical lymphadenopathy    Lungs:  CTAB,  no resp distress    CV: rrr, no m/r/g, ppi    Abd: soft, nontender, nondistended, no rebound/masses/guarding/hsm    Ext: no peripheral edema    Skin: warm, dry, well perused, no rashes/bruising/lesions on exposed skin    Neuro:   alert, follows commands, speech clear, CN 2-12 intact,   strength 5/5 and symmetric in BUE intrinsic hand muscles as well as BLE  SILT in all 4 ext  Negative Pronator drift  cerebellar testing unremarkable    Positive Perry-Hallpike with head to the left symptoms had a slight delay once in the leftward position severe and acute in onset, fatigable after 60-90 seconds.  No symptoms with head and rightward position    Psych: Normal mood, normal affect    Emergency Department Course   ECG (4:58:45):  Rate 68 bpm. GA interval 184. QRS duration 94. QT/QTc 426/452. P-R-T axes 43 46 51. Normal sinus rhythm. Normal ECG. Agree with computer interpretation. Interpreted at 0525 by Lee Hull MD.     Interventions:  0506: Antivert 50 mg PO  0541: Zofran-ODT 4 mg PO  0543: Ativan 0.5 mg PO    Emergency Department Course:  Past medical records, nursing notes, and vitals reviewed.  0503: I performed an exam of the patient and obtained history, as documented above.    I rechecked the patient. Findings and plan explained to the Patient and spouse. Patient discharged home with instructions regarding supportive care, medications, and reasons to return. The importance of close follow-up was reviewed.    Impression & Plan    Medical Decision Making:  Simon Koch is a 67 year old male  presenting with an acute onset of vertigo after turning over to look at the clock this morning. He had a positive [place]-Hallpike here with head in a leftward position with delay to symptoms acute onset in severity and fatigability. The  remainder of the neurologic exam is unremarkable. No preceding trauma, neck pain, or cranial nerve abnormalities to suggest a cervical artery dissection with suspicion for central vertigo cause. Symptomatic treatments here. Road test to see if he can safely be discharged home versus admission to observation. Plan to follow-up for potential vestibular therapy.    Did well here in the emergency Department with the above interventions. Able to do a road test with significant return of symptoms he's safe and stable for discharge home with his wife understand when to return to the emergency department and the role of vestibular therapy    Diagnosis:    ICD-10-CM    1. Benign paroxysmal positional vertigo of left ear H81.12        Disposition:  Discharged to home with plan as outlined above.    Discharge Medications:  New Prescriptions    LORAZEPAM (ATIVAN) 1 MG TABLET    Take 0.5 tablets (0.5 mg) by mouth every 6 hours as needed for anxiety (vertigo)    MECLIZINE (ANTIVERT) 25 MG TABLET    Take 1-2 tablets (25-50 mg) by mouth 3 times daily as needed for dizziness    ONDANSETRON (ZOFRAN ODT) 4 MG ODT TAB    Take 1 tablet (4 mg) by mouth every 8 hours as needed         Jad Ford  10/7/2017   [facility] EMERGENCY DEPARTMENT  IJad, am serving as a scribe at 5:03 AM on 10/7/2017 to document services personally performed by Lee Hull MD based on my observations and the provider's statements to me.       Lee Hull MD  10/07/17 0641

## 2017-10-07 NOTE — ED NOTES
Pt in with C/O dizziness when he awoke this morning. Pt reports he felt as if the room was spinning. Pt A&Ox4 on arrival ABC's intact on arrival

## 2017-10-07 NOTE — ED AVS SNAPSHOT
[facility]    [address]    [facility] [address]    Phone:  [phone]    Fax:  [phone]                                       Simon Koch   MRN: [id number]    Department:  [facility]   Date of Visit:  10/7/2017           Patient Information     Date Of Birth          1950        Your diagnoses for this visit were:     Benign paroxysmal positional vertigo of left ear        You were seen by Lee Hull MD.        Discharge Instructions       Please make an appointment to follow up with [facility] [phone] in 2-3 days if not improving.    Contact [facility] as this can also help the vertigo resolve faster        Benign Paroxysmal Positional Vertigo     Your health care provider may move your head in certain ways to treat your BPPV.     Benign paroxysmal positional vertigo (BPPV) is a problem with the inner ear. The inner ear contains the vestibular system. This system is what helps you keep your balance. BPPV causes a feeling of spinning. It is a common problem of the vestibular system.  Understanding the vestibular system  The vestibular system of the ear is made up of very tiny parts. They include the utricle, saccule, and semicircular canals. The utricle is a tiny organ that contains calcium crystals. In some people, the crystals can move into the semicircular canals. When this happens, the system no longer works as it should. This causes BPPV. Benign means it is not life-threatening. Paroxysmal means it happens suddenly. Positional means that it happens when you move your head. Vertigo is a feeling of spinning.  What causes BPPV?  Causes include injury to your head or neck. Other problems with the vestibular system may cause BPPV. In many people, the cause of BPPV is not known.  Symptoms of BPPV  You many have repeated feelings of spinning (vertigo). The vertigo  usually lasts less than 1 minute. Some movements, suchas rolling over in bed, can bring on vertigo.  Diagnosing BPPV  Your primary health care provider may diagnose and treat your BPPV. Or you may see an ear, nose, and throat doctor (otolaryngologist). In some cases, you may see a nervous system doctor (neurologist).  The health care provider will ask about your symptoms and your medical history. He or she will examine you. You may have hearing and balance tests. As part of the exam, your health care provider may have you move your head and body in certain ways. If you have BPPV, the movements can bring on vertigo. Your provider will also look for abnormal movements of your eyes. You may have other tests to check your vestibular or nervous systems.  Treatment for BPPV  Your health care provider may try to move the calcium crystals. This is done by having you move your head and neck in certain ways. This treatment is safe and often works well. You may also be told to do these movements at home. You may still have vertigo for a few weeks. Your health care provider will recheck your symptoms, usually in about a month. Special physical therapy may also be part of treatment. In rare cases surgery may be needed for BPPV that does not go away.     When to call the health care provider  Call your health care provider right away if you have any of these:    Symptoms that do not go away with treatment    Symptoms that get worse    New symptoms   Date Last Reviewed: 3/19/2015    8496-0440 [employer]. [address] [place] [address]. All rights reserved. This information is not intended as a substitute for professional medical care. Always follow your healthcare professional's instructions.            Meclizine tablets or capsules  What is this medicine?  MECLIZINE (MEK dale zeen) is an antihistamine. It is used to prevent nausea, vomiting, or dizziness caused by motion sickness. It is also used to  prevent and treat vertigo (extreme dizziness or a feeling that you or your surroundings are tilting or spinning around).  How should I use this medicine?  Take this medicine by mouth with a glass of water. Follow the directions on the prescription label. If you are using this medicine to prevent motion sickness, take the dose at least 1 hour before travel. If it upsets your stomach, take it with food or milk. Take your doses at regular intervals. Do not take your medicine more often than directed.  Talk to your pediatrician regarding the use of this medicine in children. Special care may be needed.  What side effects may I notice from receiving this medicine?  Side effects that you should report to your doctor or health care professional as soon as possible:    feeling faint or lightheaded, falls    fast, irregular heartbeat  Side effects that usually do not require medical attention (report these to your doctor or health care professional if they continue or are bothersome):    constipation    headache    trouble passing urine or change in the amount of urine    trouble sleeping    upset stomach  What may interact with this medicine?  Do not take this medicine with any of the following medications:    MAOIs like Carbex, Eldepryl, Marplan, Nardil, and Parnate  This medicine may also interact with the following medications:    alcohol    antihistamines for allergy, cough and cold    certain medicines for anxiety or sleep    certain medicines for depression, like amitriptyline, fluoxetine, sertraline    certain medicines for seizures like phenobarbital, primidone    general anesthetics like halothane, isoflurane, methoxyflurane, propofol    local anesthetics like lidocaine, pramoxine, tetracaine    medicines that relax muscles for surgery    narcotic medicines for pain    phenothiazines like chlorpromazine, mesoridazine, prochlorperazine, thioridazine  What if I miss a dose?  If you miss a dose, take it as soon as you  can. If it is almost time for your next dose, take only that dose. Do not take double or extra doses.  Where should I keep my medicine?  Keep out of the reach of children.  Store at room temperature between 15 and 30 degrees C (59 and 86 degrees F). Keep container tightly closed. Throw away any unused medicine after the expiration date.  What should I tell my health care provider before I take this medicine?  They need to know if you have any of these conditions:    glaucoma    lung or breathing disease, like asthma    problems urinating    prostate disease    stomach or intestine problems    an unusual or allergic reaction to meclizine, other medicines, foods, dyes, or preservatives    pregnant or trying to get pregnant    breast-feeding  What should I watch for while using this medicine?  Tell your doctor or healthcare professional if your symptoms do not start to get better or if they get worse.  You may get drowsy or dizzy. Do not drive, use machinery, or do anything that needs mental alertness until you know how this medicine affects you. Do not stand or sit up quickly, especially if you are an older patient. This reduces the risk of dizzy or fainting spells. Alcohol may interfere with the effect of this medicine. Avoid alcoholic drinks.  Your mouth may get dry. Chewing sugarless gum or sucking hard candy, and drinking plenty of water may help. Contact your doctor if the problem does not go away or is severe.  This medicine may cause dry eyes and blurred vision. If you wear contact lenses you may feel some discomfort. Lubricating drops may help. See your eye doctor if the problem does not go away or is severe.  NOTE:This sheet is a summary. It may not cover all possible information. If you have questions about this medicine, talk to your doctor, pharmacist, or health care provider. Copyright  2017 [employer]         Patient Education    Dextrose, Ondansetron Hydrochloride Solution for  injection    Ondansetron Hydrochloride Oral solution    Ondansetron Hydrochloride Oral tablet    Ondansetron Hydrochloride Solution for injection    Ondansetron Hydrochloride, Sodium Chloride Solution for injection    Ondansetron Oral disintegrating tablet    Ondansetron Oral dissolving film  Ondansetron Oral disintegrating tablet  What is this medicine?  ONDANSETRON (on DAN se zainab) is used to treat nausea and vomiting caused by chemotherapy. It is also used to prevent or treat nausea and vomiting after surgery.  This medicine may be used for other purposes; ask your health care provider or pharmacist if you have questions.  What should I tell my health care provider before I take this medicine?  They need to know if you have any of these conditions:    heart disease    history of irregular heartbeat    liver disease    low levels of magnesium or potassium in the blood    an unusual or allergic reaction to ondansetron, granisetron, other medicines, foods, dyes, or preservatives    pregnant or trying to get pregnant    breast-feeding  How should I use this medicine?  These tablets are made to dissolve in the mouth. Do not try to push the tablet through the foil backing. With dry hands, peel away the foil backing and gently remove the tablet. Place the tablet in the mouth and allow it to dissolve, then swallow. While you may take these tablets with water, it is not necessary to do so.  Talk to your pediatrician regarding the use of this medicine in children. Special care may be needed.  Overdosage: If you think you have taken too much of this medicine contact a poison control center or emergency room at once.  NOTE: This medicine is only for you. Do not share this medicine with others.  What if I miss a dose?  If you miss a dose, take it as soon as you can. If it is almost time for your next dose, take only that dose. Do not take double or extra doses.  What may interact with this medicine?  Do not take this medicine  with any of the following medications:    apomorphine    certain medicines for fungal infections like fluconazole, itraconazole, ketoconazole, posaconazole, voriconazole    cisapride    dofetilide    dronedarone    pimozide    thioridazine    ziprasidone  This medicine may also interact with the following medications:    carbamazepine    certain medicines for depression, anxiety, or psychotic disturbances    fentanyl    linezolid    MAOIs like Carbex, Eldepryl, Marplan, Nardil, and Parnate    methylene blue (injected into a vein)    other medicines that prolong the QT interval (cause an abnormal heart rhythm)    phenytoin    rifampicin    tramadol  This list may not describe all possible interactions. Give your health care provider a list of all the medicines, herbs, non-prescription drugs, or dietary supplements you use. Also tell them if you smoke, drink alcohol, or use illegal drugs. Some items may interact with your medicine.  What should I watch for while using this medicine?  Check with your doctor or health care professional as soon as you can if you have any sign of an allergic reaction.  What side effects may I notice from receiving this medicine?  Side effects that you should report to your doctor or health care professional as soon as possible:    allergic reactions like skin rash, itching or hives, swelling of the face, lips, or tongue    breathing problems    confusion    dizziness    fast or irregular heartbeat    feeling faint or lightheaded, falls    fever and chills    loss of balance or coordination    seizures    sweating    swelling of the hands and feet    tightness in the chest    tremors    unusually weak or tired  Side effects that usually do not require medical attention (report to your doctor or health care professional if they continue or are bothersome):    constipation or diarrhea    headache  This list may not describe all possible side effects. Call your doctor for medical advice about  side effects. You may report side effects to FDA at 1-484-SKX-1556.  Where should I keep my medicine?  Keep out of the reach of children.  Store between 2 and 30 degrees C (36 and 86 degrees F). Throw away any unused medicine after the expiration date.  NOTE:This sheet is a summary. It may not cover all possible information. If you have questions about this medicine, talk to your doctor, pharmacist, or health care provider. Copyright  2016 [employer] Standard         Patient Education    Lorazepam Oral solution    Lorazepam Oral tablet    Lorazepam Solution for injection  Lorazepam Oral tablet  What is this medicine?  LORAZEPAM (hannah A ze adolph) is a benzodiazepine. It is used to treat anxiety.  This medicine may be used for other purposes; ask your health care provider or pharmacist if you have questions.  What should I tell my health care provider before I take this medicine?  They need to know if you have any of these conditions:    alcohol or drug abuse problem    bipolar disorder, depression, psychosis or other mental health condition    glaucoma    kidney or liver disease    lung disease or breathing difficulties    myasthenia gravis    Parkinson's disease    seizures or a history of seizures    suicidal thoughts    an unusual or allergic reaction to lorazepam, other benzodiazepines, foods, dyes, or preservatives    pregnant or trying to get pregnant    breast-feeding  How should I use this medicine?  Take this medicine by mouth with a glass of water. Follow the directions on the prescription label. If it upsets your stomach, take it with food or milk. Take your medicine at regular intervals. Do not take it more often than directed. Do not stop taking except on the advice of your doctor or health care professional.  Talk to your pediatrician regarding the use of this medicine in children. Special care may be needed.  Overdosage: If you think you have taken too much of this medicine contact a poison control center or  emergency room at once.  NOTE: This medicine is only for you. Do not share this medicine with others.  What if I miss a dose?  If you miss a dose, take it as soon as you can. If it is almost time for your next dose, take only that dose. Do not take double or extra doses.  What may interact with this medicine?    barbiturate medicines for inducing sleep or treating seizures, like phenobarbital    clozapine    medicines for depression, mental problems or psychiatric disturbances    medicines for sleep    phenytoin    probenecid    theophylline    valproic acid  This list may not describe all possible interactions. Give your health care provider a list of all the medicines, herbs, non-prescription drugs, or dietary supplements you use. Also tell them if you smoke, drink alcohol, or use illegal drugs. Some items may interact with your medicine.  What should I watch for while using this medicine?  Visit your doctor or health care professional for regular checks on your progress. Your body may become dependent on this medicine, ask your doctor or health care professional if you still need to take it. However, if you have been taking this medicine regularly for some time, do not suddenly stop taking it. You must gradually reduce the dose or you may get severe side effects. Ask your doctor or health care professional for advice before increasing or decreasing the dose. Even after you stop taking this medicine it can still affect your body for several days.  You may get drowsy or dizzy. Do not drive, use machinery, or do anything that needs mental alertness until you know how this medicine affects you. To reduce the risk of dizzy and fainting spells, do not stand or sit up quickly, especially if you are an older patient. Alcohol may increase dizziness and drowsiness. Avoid alcoholic drinks.  Do not treat yourself for coughs, colds or allergies without asking your doctor or health care professional for advice. Some  ingredients can increase possible side effects.  What side effects may I notice from receiving this medicine?  Side effects that you should report to your doctor or health care professional as soon as possible:    changes in vision    confusion    depression    mood changes, excitability or aggressive behavior    movement difficulty, staggering or jerky movements    muscle cramps    restlessness    weakness or tiredness  Side effects that usually do not require medical attention (report to your doctor or health care professional if they continue or are bothersome):    constipation or diarrhea    difficulty sleeping, nightmares    dizziness, drowsiness    headache    nausea, vomiting  This list may not describe all possible side effects. Call your doctor for medical advice about side effects. You may report side effects to FDA at 1-097-FDA-0319.  Where should I keep my medicine?  Keep out of the reach of children. This medicine can be abused. Keep your medicine in a safe place to protect it from theft. Do not share this medicine with anyone. Selling or giving away this medicine is dangerous and against the law.  Store at room temperature between 20 and 25 degrees C (68 and 77 degrees F). Protect from light. Keep container tightly closed. Throw away any unused medicine after the expiration date.  NOTE:This sheet is a summary. It may not cover all possible information. If you have questions about this medicine, talk to your doctor, pharmacist, or health care provider. Copyright  2016 [employer] Standard                 24 Hour Appointment Hotline       To make an appointment at any [facility], call [phone] ([phone]). If you don't have a family doctor or clinic, we will help you find one. [facility] are conveniently located to serve the needs of you and your family.          ED Discharge Orders     PHYSICAL THERAPY REFERRAL       *This therapy referral will be filtered to a centralized scheduling  "office at [facility] and the patient will receive a call to schedule an appointment at a [place] location most convenient for them. *     [facility] provides Physical Therapy evaluation and treatment and many specialty services across the [place] system.  If requesting a specialty program, please choose from the list below.    If you have not heard from the scheduling office within 2 business days, please call [phone] for all locations, with the exception of [place], please call [phone].  Treatment: Evaluation & Treatment  Special Instructions/Modalities: vestibular  Special Programs: Balance/Vestibular    Please be aware that coverage of these services is subject to the terms and limitations of your health insurance plan.  Call member services at your health plan with any benefit or coverage questions.      **Note to Provider:  If you are referring outside of [place] for the therapy appointment, please list the name of the location in the \"special instructions\" above, print the referral and give to the patient to schedule the appointment.                     Review of your medicines      START taking        Dose / Directions Last dose taken    LORazepam 1 MG tablet   Commonly known as:  ATIVAN   Dose:  0.5 mg   Quantity:  10 tablet        Take 0.5 tablets (0.5 mg) by mouth every 6 hours as needed for anxiety (vertigo)   Refills:  0        meclizine 25 MG tablet   Commonly known as:  ANTIVERT   Dose:  25-50 mg   Quantity:  30 tablet        Take 1-2 tablets (25-50 mg) by mouth 3 times daily as needed for dizziness   Refills:  1        ondansetron 4 MG ODT tab   Commonly known as:  ZOFRAN ODT   Dose:  4 mg   Quantity:  10 tablet        Take 1 tablet (4 mg) by mouth every 8 hours as needed   Refills:  0          Our records show that you are taking the medicines listed below. If these are incorrect, please call your family doctor or clinic.        Dose / Directions " Last dose taken    ALTACE PO        Refills:  0        ASPIRIN PO   Dose:  81 mg        Take 81 mg by mouth   Refills:  0        CRESTOR PO   Dose:  20 mg        Take 20 mg by mouth   Refills:  0        VITAMIN D (CHOLECALCIFEROL) PO        Take by mouth daily   Refills:  0                Prescriptions were sent or printed at these locations (3 Prescriptions)                   Other Prescriptions                Printed at Department/Unit printer (3 of 3)         meclizine (ANTIVERT) 25 MG tablet               LORazepam (ATIVAN) 1 MG tablet               ondansetron (ZOFRAN ODT) 4 MG ODT tab                Procedures and tests performed during your visit     EKG 12 lead      Orders Needing Specimen Collection     None      Pending Results     Date and Time Order Name Status Description    10/7/2017 0456 EKG 12 lead Preliminary             Pending Culture Results     No orders found from 10/5/2017 to 10/8/2017.            Pending Results Instructions     If you had any lab results that were not finalized at the time of your Discharge, you can call the ED Lab Result RN at [phone]. You will be contacted by this team for any positive Lab results or changes in treatment. The nurses are available 7 days a week from 10A to 6:30P.  You can leave a message 24 hours per day and they will return your call.        Test Results From Your Hospital Stay               Clinical Quality Measure: Blood Pressure Screening     Your blood pressure was checked while you were in the emergency department today. The last reading we obtained was  BP: 118/80 . Please read the guidelines below about what these numbers mean and what you should do about them.  If your systolic blood pressure (the top number) is less than 120 and your diastolic blood pressure (the bottom number) is less than 80, then your blood pressure is normal. There is nothing more that you need to do about it.  If your systolic blood pressure (the top number) is 120-139  "or your diastolic blood pressure (the bottom number) is 80-89, your blood pressure may be higher than it should be. You should have your blood pressure rechecked within a year by a primary care provider.  If your systolic blood pressure (the top number) is 140 or greater or your diastolic blood pressure (the bottom number) is 90 or greater, you may have high blood pressure. High blood pressure is treatable, but if left untreated over time it can put you at risk for heart attack, stroke, or kidney failure. You should have your blood pressure rechecked by a primary care provider within the next 4 weeks.  If your provider in the emergency department today gave you specific instructions to follow-up with your doctor or provider even sooner than that, you should follow that instruction and not wait for up to 4 weeks for your follow-up visit.        Thank you for choosing [facility]       Thank you for choosing [facility] for your care. Our goal is always to provide you with excellent care. Hearing back from our patients is one way we can continue to improve our services. Please take a few minutes to complete the written survey that you may receive in the mail after you visit with us. Thank you!        [employer] Information     [employer] lets you send messages to your doctor, view your test results, renew your prescriptions, schedule appointments and more. To sign up, go to [email or web address] . Click on \"Log in\" on the left side of the screen, which will take you to the Welcome page. Then click on \"Sign up Now\" on the right side of the page.     You will be asked to enter the access code listed below, as well as some personal information. Please follow the directions to create your username and password.     Your access code is: [email or web address]  Expires: 2018  6:41 AM     Your access code will  in 90 days. If you need help or a new code, please call your [place] clinic or [phone].        Care EveryWhere ID     " This is your Care EveryWhere ID. This could be used by other organizations to access your [place] medical records  [id number]        Equal Access to Services     ANA PAULA PHAM : Chelsey Skinner, jhon hussein, irene talamantes, rajat saleh. So [facility] [phone].    ATENCIÓN: Si habla español, tiene a carvajal disposición servicios gratuitos de asistencia lingüística. Llame al [phone].    We comply with applicable federal civil rights laws and [place] laws. We do not discriminate on the basis of race, color, national origin, age, disability, sex, sexual orientation, or gender identity.            After Visit Summary       This is your record. Keep this with you and show to your community pharmacist(s) and doctor(s) at your next visit.

## 2017-10-08 LAB — INTERPRETATION ECG - MUSE: NORMAL

## 2018-01-24 ENCOUNTER — HOSPITAL ENCOUNTER (EMERGENCY)
Facility: CLINIC | Age: 68
Discharge: HOME | End: 2018-01-24
Attending: EMERGENCY MEDICINE | Admitting: EMERGENCY MEDICINE
Payer: MEDICARE

## 2018-01-24 VITALS
WEIGHT: 135 LBS | RESPIRATION RATE: 18 BRPM | HEIGHT: 66 IN | OXYGEN SATURATION: 100 % | TEMPERATURE: 98.2 F | SYSTOLIC BLOOD PRESSURE: 137 MMHG | BODY MASS INDEX: 21.69 KG/M2 | DIASTOLIC BLOOD PRESSURE: 85 MMHG

## 2018-01-24 DIAGNOSIS — M62.81 GENERALIZED MUSCLE WEAKNESS: ICD-10-CM

## 2018-01-24 LAB
ALBUMIN SERPL-MCNC: 4.1 G/DL (ref 3.4–5)
ALP SERPL-CCNC: 72 U/L (ref 40–150)
ALT SERPL W P-5'-P-CCNC: 24 U/L (ref 0–70)
ANION GAP SERPL CALCULATED.3IONS-SCNC: 5 MMOL/L (ref 3–14)
AST SERPL W P-5'-P-CCNC: 20 U/L (ref 0–45)
BASOPHILS # BLD AUTO: 0 10E9/L (ref 0–0.2)
BASOPHILS NFR BLD AUTO: 0.4 %
BILIRUB DIRECT SERPL-MCNC: 0.1 MG/DL (ref 0–0.2)
BILIRUB SERPL-MCNC: 0.5 MG/DL (ref 0.2–1.3)
BUN SERPL-MCNC: 19 MG/DL (ref 7–30)
CALCIUM SERPL-MCNC: 9 MG/DL (ref 8.5–10.1)
CHLORIDE SERPL-SCNC: 104 MMOL/L (ref 94–109)
CK SERPL-CCNC: 198 U/L (ref 30–300)
CO2 SERPL-SCNC: 30 MMOL/L (ref 20–32)
CREAT SERPL-MCNC: 0.89 MG/DL (ref 0.66–1.25)
DIFFERENTIAL METHOD BLD: NORMAL
EOSINOPHIL # BLD AUTO: 0.2 10E9/L (ref 0–0.7)
EOSINOPHIL NFR BLD AUTO: 2 %
ERYTHROCYTE [DISTWIDTH] IN BLOOD BY AUTOMATED COUNT: 12.3 % (ref 10–15)
GFR SERPL CREATININE-BSD FRML MDRD: 85 ML/MIN/1.7M2
GLUCOSE SERPL-MCNC: 91 MG/DL (ref 70–99)
HCT VFR BLD AUTO: 46.5 % (ref 40–53)
HGB BLD-MCNC: 15.7 G/DL (ref 13.3–17.7)
IMM GRANULOCYTES # BLD: 0 10E9/L (ref 0–0.4)
IMM GRANULOCYTES NFR BLD: 0.2 %
LYMPHOCYTES # BLD AUTO: 1.6 10E9/L (ref 0.8–5.3)
LYMPHOCYTES NFR BLD AUTO: 19.8 %
MCH RBC QN AUTO: 31.2 PG (ref 26.5–33)
MCHC RBC AUTO-ENTMCNC: 33.8 G/DL (ref 31.5–36.5)
MCV RBC AUTO: 92 FL (ref 78–100)
MONOCYTES # BLD AUTO: 0.7 10E9/L (ref 0–1.3)
MONOCYTES NFR BLD AUTO: 8.9 %
NEUTROPHILS # BLD AUTO: 5.6 10E9/L (ref 1.6–8.3)
NEUTROPHILS NFR BLD AUTO: 68.7 %
NRBC # BLD AUTO: 0 10*3/UL
NRBC BLD AUTO-RTO: 0 /100
PLATELET # BLD AUTO: 224 10E9/L (ref 150–450)
POTASSIUM SERPL-SCNC: 4 MMOL/L (ref 3.4–5.3)
PROT SERPL-MCNC: 7.6 G/DL (ref 6.8–8.8)
RBC # BLD AUTO: 5.03 10E12/L (ref 4.4–5.9)
SODIUM SERPL-SCNC: 139 MMOL/L (ref 133–144)
TROPONIN I SERPL-MCNC: <0.015 UG/L (ref 0–0.04)
WBC # BLD AUTO: 8.2 10E9/L (ref 4–11)

## 2018-01-24 PROCEDURE — 80076 HEPATIC FUNCTION PANEL: CPT | Performed by: EMERGENCY MEDICINE

## 2018-01-24 PROCEDURE — 99284 EMERGENCY DEPT VISIT MOD MDM: CPT | Mod: 25

## 2018-01-24 PROCEDURE — 93005 ELECTROCARDIOGRAM TRACING: CPT

## 2018-01-24 PROCEDURE — 25000128 H RX IP 250 OP 636: Performed by: EMERGENCY MEDICINE

## 2018-01-24 PROCEDURE — 84484 ASSAY OF TROPONIN QUANT: CPT | Performed by: EMERGENCY MEDICINE

## 2018-01-24 PROCEDURE — 80048 BASIC METABOLIC PNL TOTAL CA: CPT | Performed by: EMERGENCY MEDICINE

## 2018-01-24 PROCEDURE — 96360 HYDRATION IV INFUSION INIT: CPT

## 2018-01-24 PROCEDURE — 85025 COMPLETE CBC W/AUTO DIFF WBC: CPT | Performed by: EMERGENCY MEDICINE

## 2018-01-24 PROCEDURE — 82550 ASSAY OF CK (CPK): CPT | Performed by: EMERGENCY MEDICINE

## 2018-01-24 RX ORDER — OMEGA-3-ACID ETHYL ESTERS 1 G/1
2 CAPSULE, LIQUID FILLED ORAL 2 TIMES DAILY
COMMUNITY

## 2018-01-24 RX ADMIN — SODIUM CHLORIDE 1000 ML: 9 INJECTION, SOLUTION INTRAVENOUS at 19:20

## 2018-01-24 ASSESSMENT — ENCOUNTER SYMPTOMS: NAUSEA: 0

## 2018-01-24 NOTE — ED AVS SNAPSHOT
[facility]    [address] [place] [address]    Phone:  [phone]    Fax:  [phone]                                       Simon Koch   MRN: [id number]    Department:  [facility]   Date of Visit:  1/24/2018           Patient Information     Date Of Birth          1950        Your diagnoses for this visit were:     Generalized muscle weakness        You were seen by Kevin Salinas DO.      Follow-up Information     Follow up with Aniyah Hernandez. Call in 2 days.    Specialty:  Internal Medicine    Why:  As needed    Contact information:    [facility]  [address] [place] [address]  [phone]          Follow up with [facility].    Specialty:  EMERGENCY MEDICINE    Why:  If symptoms worsen    Contact information:    [address] [place] [address]  [phone]        Discharge Instructions         Weakness (Uncertain Cause)  Based on your exam today, the exact cause of your weakness is not certain. However, your weakness does not seem to be a sign of a serious illness at this time. Keep an eye on your symptoms and get medical advice as instructed below.  Home care    Rest at home today. Do not over-exert yourself.    Take any medicine as prescribed.    For the next few days, drink extra fluids (unless your healthcare provider wants you to restrict fluids for other reasons). Do not skip meals.  Follow-up care  Follow up with your healthcare provider or as advised.  When to seek medical advice  Call your healthcare provider for any of the following    Worsening of your symptoms    Symptoms don't start getting better within 2 days    Fever of 100.4  F (38  C) or higher, or as directed by your healthcare provider     Call 911  Get emergency medical care for any of these:    Chest, arm, neck, jaw or upper back pain    Trouble  breathing    Numbness or weakness of the face, one arm or one leg    Slurred speech, confusion, trouble speaking, walking or seeing    Blood in vomit or stool (black or red color)    Loss of consciousness  Date Last Reviewed: 6/10/2015    7328-2969 [employer]. [address] [place] [address]. All rights reserved. This information is not intended as a substitute for professional medical care. Always follow your healthcare professional's instructions.          24 Hour Appointment Hotline       To make an appointment at any [facility], call [phone] ([phone]). If you don't have a family doctor or clinic, we will help you find one. [facility] are conveniently located to serve the needs of you and your family.             Review of your medicines      Our records show that you are taking the medicines listed below. If these are incorrect, please call your family doctor or clinic.        Dose / Directions Last dose taken    ALTACE PO        Refills:  0        ASPIRIN PO   Dose:  81 mg        Take 81 mg by mouth   Refills:  0        CRESTOR PO   Dose:  20 mg        Take 20 mg by mouth   Refills:  0        LORazepam 1 MG tablet   Commonly known as:  ATIVAN   Dose:  0.5 mg   Quantity:  10 tablet        Take 0.5 tablets (0.5 mg) by mouth every 6 hours as needed for anxiety (vertigo)   Refills:  0        meclizine 25 MG tablet   Commonly known as:  ANTIVERT   Dose:  25-50 mg   Quantity:  30 tablet        Take 1-2 tablets (25-50 mg) by mouth 3 times daily as needed for dizziness   Refills:  1        omega-3 acid ethyl esters 1 G capsule   Commonly known as:  Lovaza   Dose:  2 g        Take 2 g by mouth 2 times daily   Refills:  0        VITAMIN D (CHOLECALCIFEROL) PO        Take by mouth daily   Refills:  0                Procedures and tests performed during your visit     Basic metabolic panel    CBC with platelets differential    CK total    EKG 12-lead, tracing only     Hepatic panel    Peripheral IV catheter    Troponin I      Orders Needing Specimen Collection     None      Pending Results     No orders found from 1/22/2018 to 1/25/2018.            Pending Culture Results     No orders found from 1/22/2018 to 1/25/2018.            Pending Results Instructions     If you had any lab results that were not finalized at the time of your Discharge, you can call the ED Lab Result RN at [phone]. You will be contacted by this team for any positive Lab results or changes in treatment. The nurses are available 7 days a week from 10A to 6:30P.  You can leave a message 24 hours per day and they will return your call.        Test Results From Your Hospital Stay        1/24/2018  7:28 PM      Component Results     Component Value Ref Range & Units Status    WBC 8.2 4.0 - 11.0 10e9/L Final    RBC Count 5.03 4.4 - 5.9 10e12/L Final    Hemoglobin 15.7 13.3 - 17.7 g/dL Final    Hematocrit 46.5 40.0 - 53.0 % Final    MCV 92 78 - 100 fl Final    MCH 31.2 26.5 - 33.0 pg Final    MCHC 33.8 31.5 - 36.5 g/dL Final    RDW 12.3 10.0 - 15.0 % Final    Platelet Count 224 150 - 450 10e9/L Final    Diff Method Automated Method  Final    % Neutrophils 68.7 % Final    % Lymphocytes 19.8 % Final    % Monocytes 8.9 % Final    % Eosinophils 2.0 % Final    % Basophils 0.4 % Final    % Immature Granulocytes 0.2 % Final    Nucleated RBCs 0 0 /100 Final    Absolute Neutrophil 5.6 1.6 - 8.3 10e9/L Final    Absolute Lymphocytes 1.6 0.8 - 5.3 10e9/L Final    Absolute Monocytes 0.7 0.0 - 1.3 10e9/L Final    Absolute Eosinophils 0.2 0.0 - 0.7 10e9/L Final    Absolute Basophils 0.0 0.0 - 0.2 10e9/L Final    Abs Immature Granulocytes 0.0 0 - 0.4 10e9/L Final    Absolute Nucleated RBC 0.0  Final         1/24/2018  7:47 PM      Component Results     Component Value Ref Range & Units Status    Sodium 139 133 - 144 mmol/L Final    Potassium 4.0 3.4 - 5.3 mmol/L Final    Chloride 104 94 - 109 mmol/L Final    Carbon Dioxide  30 20 - 32 mmol/L Final    Anion Gap 5 3 - 14 mmol/L Final    Glucose 91 70 - 99 mg/dL Final    Urea Nitrogen 19 7 - 30 mg/dL Final    Creatinine 0.89 0.66 - 1.25 mg/dL Final    GFR Estimate 85 >60 mL/min/1.7m2 Final    Non  GFR Calc    GFR Estimate If Black >90 >60 mL/min/1.7m2 Final    African American GFR Calc    Calcium 9.0 8.5 - 10.1 mg/dL Final         1/24/2018  7:47 PM      Component Results     Component Value Ref Range & Units Status    Troponin I ES <0.015 0.000 - 0.045 ug/L Final    The 99th percentile for upper reference range is 0.045 ug/L.  Troponin values   in the range of 0.045 - 0.120 ug/L may be associated with risks of adverse   clinical events.           1/24/2018  7:47 PM      Component Results     Component Value Ref Range & Units Status    CK Total 198 30 - 300 U/L Final         1/24/2018  8:21 PM      Component Results     Component Value Ref Range & Units Status    Bilirubin Direct 0.1 0.0 - 0.2 mg/dL Final    Bilirubin Total 0.5 0.2 - 1.3 mg/dL Final    Albumin 4.1 3.4 - 5.0 g/dL Final    Protein Total 7.6 6.8 - 8.8 g/dL Final    Alkaline Phosphatase 72 40 - 150 U/L Final    ALT 24 0 - 70 U/L Final    AST 20 0 - 45 U/L Final                Clinical Quality Measure: Blood Pressure Screening     Your blood pressure was checked while you were in the emergency department today. The last reading we obtained was  BP: 121/72 . Please read the guidelines below about what these numbers mean and what you should do about them.  If your systolic blood pressure (the top number) is less than 120 and your diastolic blood pressure (the bottom number) is less than 80, then your blood pressure is normal. There is nothing more that you need to do about it.  If your systolic blood pressure (the top number) is 120-139 or your diastolic blood pressure (the bottom number) is 80-89, your blood pressure may be higher than it should be. You should have your blood pressure rechecked within a year by  "a primary care provider.  If your systolic blood pressure (the top number) is 140 or greater or your diastolic blood pressure (the bottom number) is 90 or greater, you may have high blood pressure. High blood pressure is treatable, but if left untreated over time it can put you at risk for heart attack, stroke, or kidney failure. You should have your blood pressure rechecked by a primary care provider within the next 4 weeks.  If your provider in the emergency department today gave you specific instructions to follow-up with your doctor or provider even sooner than that, you should follow that instruction and not wait for up to 4 weeks for your follow-up visit.        Thank you for choosing [facility]       Thank you for choosing [facility] for your care. Our goal is always to provide you with excellent care. Hearing back from our patients is one way we can continue to improve our services. Please take a few minutes to complete the written survey that you may receive in the mail after you visit with us. Thank you!        People and [employer] Information     [employer] lets you send messages to your doctor, view your test results, renew your prescriptions, schedule appointments and more. To sign up, go to [email or web address] . Click on \"Log in\" on the left side of the screen, which will take you to the Welcome page. Then click on \"Sign up Now\" on the right side of the page.     You will be asked to enter the access code listed below, as well as some personal information. Please follow the directions to create your username and password.     Your access code is: [email or web address]  Expires: 2018  8:28 PM     Your access code will  in 90 days. If you need help or a new code, please call your [facility] clinic or [phone].        Care EveryWhere ID     This is your Care EveryWhere ID. This could be used by other organizations to access your [facility] medical records  [id number]        Equal Access to Services     ANA PAULA PHAM AH: " Hadii bjorn Skinner, wakateda jovita, qaeveliata kaalrajat braun. So [facility] [phone].    ATENCIÓN: Si habla español, tiene a carvajal disposición servicios gratuitos de asistencia lingüística. Llame al [phone].    We comply with applicable federal civil rights laws and [place] laws. We do not discriminate on the basis of race, color, national origin, age, disability, sex, sexual orientation, or gender identity.            After Visit Summary       This is your record. Keep this with you and show to your community pharmacist(s) and doctor(s) at your next visit.

## 2018-01-24 NOTE — ED AVS SNAPSHOT
[facility]    [address]    [facility] [address]    Phone:  [phone]    Fax:  [phone]                                       Simon Koch   MRN: [id number]    Department:  [facility]   Date of Visit:  1/24/2018           After Visit Summary Signature Page     I have received my discharge instructions, and my questions have been answered. I have discussed any challenges I see with this plan with the nurse or doctor.    ..........................................................................................................................................  Patient/Patient Representative Signature      ..........................................................................................................................................  Patient Representative Print Name and Relationship to Patient    ..................................................               ................................................  Date                                            Time    ..........................................................................................................................................  Reviewed by Signature/Title    ...................................................              ..............................................  Date                                                            Time

## 2018-01-25 LAB — INTERPRETATION ECG - MUSE: NORMAL

## 2018-01-25 NOTE — ED PROVIDER NOTES
"  History     Chief Complaint:  Feeling ill    HPI:   The history is provided by the patient.      Simon Koch is a 67 year old male with a history of hyperlipidemia who presents for evaluation of \"not feeling well\". The patient reports that this morning after he finished snow blowing his drive way he began to feel \"lousy\", like \"the kind of thing where you just need a candy bar\". This feeling lasted about 1.5 hours for him. He did not feel like eating lunch, only some coffee and a diet coke, so his wife prompted him to contact his PCP, who recommended he present to urgent care for evaluation. He had a normal EKG and had no significant findings. Out of concern for being thorough he was prompted to come here for further evaluation. Here in the ED he endorses that he feels fine, but the symptoms that he had were not usual for him. He reports that he had a change in his Crestor dosage form 30 mg to 40 mg and has so far had no complications. The patient endorses that he is a typical long distance runner but has not done so in the last 4 days. He has had no nausea with this and has no other complaints.     Of note, the patient endorses having a normally unusually high CK total.     CARDIAC RISK FACTORS:  Sex:    Male  Tobacco:   Negative  Hypertension:   Negative  Hyperlipidemia:  Positive  Diabetes:   Negative  Family History:  Positive    Allergies:  Dilaudid [Hydromorphone]  Iodine  Penicillins  Percocet [Oxycodone-Acetaminophen]  Tetracyclines  Vicodin [Hydrocodone-Acetaminophen]     Medications:    Lovaza   Crestor   Altace   Aspirin     Past Medical History:    The patient does not have any past pertinent medical history.     Past Surgical History:    Appendectomy    ENT surgery   Orthopedic surgery     Family History:    History reviewed. No pertinent family history.      Social History:  Presents with no one    Tobacco use: Never smoker   Alcohol use: 6-7/week   PCP: Aniyah Hernandez    Marital Status:  " "     Review of Systems   Gastrointestinal: Negative for nausea.   All other systems reviewed and are negative.    Physical Exam     Patient Vitals for the past 24 hrs:   BP Temp Temp src Resp SpO2 Height Weight   01/24/18 2030 137/85 - - - 100 % - -   01/24/18 2015 - - - - 98 % - -   01/24/18 2000 121/72 - - - 98 % - -   01/24/18 1945 - - - - 99 % - -   01/24/18 1930 133/76 - - - 100 % - -   01/24/18 1923 147/85 - - - 100 % - -   01/24/18 1803 - - - - - 1.676 m (5' 6\") 61.2 kg (135 lb)   01/24/18 1759 (!) 165/93 98.2  F (36.8  C) Oral 18 98 % - -        Physical Exam  Constitutional: Patient appears well-developed and well-nourished.   HENT:   Head: No external signs of trauma noted.  Eyes: Pupils are equal, round, and reactive to light.   Cardiovascular: Normal rate, regular rhythm, normal heart sounds and intact distal pulses.    Pulmonary/Chest: Effort normal and breath sounds normal. No respiratory distress. No wheezes noted.   Abdominal: Soft. There is no tenderness. There is no rebound.   Neurological:                         Patient is alert and oriented to person, place, and time.                         Speech is fluent, cognition is normal.                        CN 2-12 intact (PERRL, EOMI, symmetric smile, equal eye squeeze and forehead raise, normal and equal sensation to bilateral forehead/cheek/chin, equal hearing to finger rub, midline tongue protrusion with nl side-to-side movement, normal shoulder shrug).                         RUE strength 5/5: , finger abd, wrist flex/ext, elbow flex/ext.                         LUE strength 5/5: , finger abd, wrist flex/ext, elbow flex/ext.                         RLE strength 5/5: ankle flex/ext, knee flex/ext, hip flex.                         LLE strength 5/5: ankle flex/ext, knee flex/ext, hip flex.                         Sensation equal in all 4 extremities.                         No arm drift.                          Cerebellar: Normal " "rapid alternating movements                                               ( finger-nose-finger, rapid pronation/supination, hand rolling)                                              Normal heel-to-shin  Skin: Skin is warm and dry.     Emergency Department Course   ECG (19:16:32):  Rate 52 bpm. AZ interval 188. QRS duration 90. QT/QTc 440/409. P-R-T axes 62 33 44. Sinus bradycardia. Otherwise normal ECG. Agree with computer interpretation. No significant changes from prior EKG on 01/24/18.  Interpreted at 1923 by Kevin Salinas DO.     Laboratory:  CK total: 198  CBC: WNL (WBC 8.2, HGB 15.7, )    BMP: WNL (Creatinine 0.89)   1909: Troponin I: <0.015    Hepatic panel: Bilirubin direct 0.1, Bilirubin total 0.5, Albumin 4.1, Protein total 7.6, ALKPHOS 72, ALT 24, AST 20     Interventions:  1920: NS 1L IV Bolus      Emergency Department Course:  Past medical records, nursing notes, and vitals reviewed.  1859: I performed an exam of the patient and obtained history, as documented above.   IV inserted and blood drawn.  This was sent to the lab for further testing, results above.  Above interventions provided.   I personally reviewed the laboratory results with the Patient and answered all related questions prior to discharge.     2028: I rechecked the patient.  Findings and plan explained to the Patient. Patient discharged home with instructions regarding supportive care, medications, and reasons to return. The importance of close follow-up was reviewed.      Impression & Plan      Medical Decision Making:  Simon Koch is a 67 year old male in room 30. He presents to the ER after \"not feeling right\". Please see the HPI and exam for specifics. The patient has remained well while in the ED and has been asymptomatic. His work up is entirely normal. LFT's and CPK are normal. At this time, as the patient feels well I believe we can discharge him, though I will encourage him to follow up closely with his " physicians in the outpatient setting. Anticipatory guidance given prior to discharge.     Impressions:    ICD-10-CM    1. Generalized muscle weakness M62.81        Disposition:  Discharged to home with plan as outlined.    Scribe Disclosure:   I, Peter Jackson, am serving as a scribe at 6:59 PM on 1/24/2018 to document services personally performed by Kevin Salinas DO based on my observations and the provider's statements to me.       Peter Jackson  1/24/2018   [facility] EMERGENCY DEPARTMENT       Kevin Salinas DO  01/25/18 0109

## 2020-07-12 NOTE — ED NOTES
"Pt was doing some outside yard work, running  today.  He came in the house and \"didn't feel right, like I need a candy bar\"  He cannot report any specific symptom just \"not right\".  He called MD who advised to go to urgent care and then sent to ED.  EKG was reportedly normal.  "
week(s)

## 2022-05-11 NOTE — DISCHARGE INSTRUCTIONS
Weakness (Uncertain Cause)  Based on your exam today, the exact cause of your weakness is not certain. However, your weakness does not seem to be a sign of a serious illness at this time. Keep an eye on your symptoms and get medical advice as instructed below.  Home care    Rest at home today. Do not over-exert yourself.    Take any medicine as prescribed.    For the next few days, drink extra fluids (unless your healthcare provider wants you to restrict fluids for other reasons). Do not skip meals.  Follow-up care  Follow up with your healthcare provider or as advised.  When to seek medical advice  Call your healthcare provider for any of the following    Worsening of your symptoms    Symptoms don't start getting better within 2 days    Fever of 100.4  F (38  C) or higher, or as directed by your healthcare provider     Call 911  Get emergency medical care for any of these:    Chest, arm, neck, jaw or upper back pain    Trouble breathing    Numbness or weakness of the face, one arm or one leg    Slurred speech, confusion, trouble speaking, walking or seeing    Blood in vomit or stool (black or red color)    Loss of consciousness  Date Last Reviewed: 6/10/2015    6653-7103 [employer]. [address] [place] [address], [place] [address]. All rights reserved. This information is not intended as a substitute for professional medical care. Always follow your healthcare professional's instructions.         Pt arrived from ED to room 15 at 1445. Pt in bed with bed alarm on. Skin assessment completed with Milena Keller RN, scattered bruising noted. Pt has expressive aphasia but able to answer most questions.

## 2024-08-01 ENCOUNTER — HOSPITAL ENCOUNTER (EMERGENCY)
Facility: HOSPITAL | Age: 74
Discharge: HOME | End: 2024-08-01
Attending: NURSE PRACTITIONER | Admitting: NURSE PRACTITIONER
Payer: MEDICARE

## 2024-08-01 ENCOUNTER — APPOINTMENT (OUTPATIENT)
Dept: CT IMAGING | Facility: HOSPITAL | Age: 74
End: 2024-08-01
Attending: NURSE PRACTITIONER
Payer: MEDICARE

## 2024-08-01 VITALS
SYSTOLIC BLOOD PRESSURE: 164 MMHG | OXYGEN SATURATION: 100 % | WEIGHT: 134.4 LBS | BODY MASS INDEX: 21.6 KG/M2 | RESPIRATION RATE: 16 BRPM | HEART RATE: 58 BPM | DIASTOLIC BLOOD PRESSURE: 84 MMHG | TEMPERATURE: 97.9 F | HEIGHT: 66 IN

## 2024-08-01 DIAGNOSIS — R51.9 FACIAL PAIN: Primary | ICD-10-CM

## 2024-08-01 DIAGNOSIS — J34.89 SINUS MUCOSAL THICKENING: ICD-10-CM

## 2024-08-01 PROCEDURE — 99213 OFFICE O/P EST LOW 20 MIN: CPT | Performed by: NURSE PRACTITIONER

## 2024-08-01 PROCEDURE — G0463 HOSPITAL OUTPT CLINIC VISIT: HCPCS | Mod: 25

## 2024-08-01 PROCEDURE — 70486 CT MAXILLOFACIAL W/O DYE: CPT | Mod: MG

## 2024-08-01 RX ORDER — EZETIMIBE 10 MG/1
10 TABLET ORAL DAILY
COMMUNITY
Start: 2024-01-12

## 2024-08-01 RX ORDER — FAMOTIDINE 20 MG/1
20 TABLET, FILM COATED ORAL
COMMUNITY
Start: 2024-07-19

## 2024-08-01 RX ORDER — SILDENAFIL 50 MG/1
TABLET, FILM COATED ORAL
COMMUNITY
Start: 2024-01-19

## 2024-08-01 ASSESSMENT — ENCOUNTER SYMPTOMS
RHINORRHEA: 1
FACIAL SWELLING: 0
FEVER: 0
SORE THROAT: 0
COUGH: 0
CHILLS: 0
SHORTNESS OF BREATH: 0

## 2024-08-01 ASSESSMENT — ACTIVITIES OF DAILY LIVING (ADL): ADLS_ACUITY_SCORE: 35

## 2024-08-01 NOTE — ED PROVIDER NOTES
"  History     Chief Complaint   Patient presents with    Facial Pain     HPI  Simon Koch is a 74 year old male who presents ambulatory to urgent care for evaluation of right-sided facial pain that started 3 days ago.  Patient states that he feels some pain over his right cheek area and feels related to .  However his teeth do not hurt and he feels the pain is actually much deeper.  He is still eating and drinking okay.  He tells me that if he lays on his left side he will start to get pain which disturbs his sleep.  If he lays on his back he feels much better.  Occasionally he is waking up and feels like his jaw is out of place.  He has tried Tylenol which does help with the pain.  He also states that he has done the Chery pot which seems to relieve pressure.  Notes he has mild allergy symptoms such as rhinorrhea.  No nasal congestion, postnasal drainage, fever, chills.  He does have a dentist and notes he sees his dentist about 3 times a year.  He has never had anything like this happen to him before    No known history of TMJ.  No recent dental procedures.  No history of sinus surgeries.    Allergies:  Allergies   Allergen Reactions    Iodine Anaphylaxis     \"Shock\" reaction in 1970's. Received Isovue-370 on 1/20/17. Premedicated with Benadryl and solu-Medrol. No Reaction.    IV type, remote, 1962    Acetaminophen Nausea    Atorvastatin Other (See Comments)     PN:  muscle pain, sense of impending doom    Contrast Dye      PN: Nonspecific Contrast Adverse Reaction    Reaction :  Iodine tracers- anaphylaxis    Dilaudid [Hydromorphone]     Erythromycin GI Disturbance    Fenofibrate Other (See Comments)     PN: Unable to Swallow, GI pain, aches    Gemfibrozil Fatigue and Other (See Comments)     PN: Increased Cholesterol, lethargy    Hydrocodone-Acetaminophen Other (See Comments)     PN: severe chills    Morphine And Codeine Nausea and Other (See Comments)     shakiness    Niacin Other (See Comments)     PN: " "Hot flashes, sense of impending doom    Other (Do Not Use) Other (See Comments)     PN: due to horse allergy    Oxycodone-Acetaminophen Other (See Comments)     PN: nauseated and \"spacy\" sense if impending doom and loss of control    Percocet [Oxycodone-Acetaminophen]     Tetracycline GI Disturbance     PN: as child    Tetracyclines & Related     Vicodin [Hydrocodone-Acetaminophen]     Penicillins Rash     PN: as child       Problem List:    There are no problems to display for this patient.       Past Medical History:    Past Medical History:   Diagnosis Date    Allergic state        Past Surgical History:    Past Surgical History:   Procedure Laterality Date    APPENDECTOMY      ENT SURGERY      ORTHOPEDIC SURGERY         Family History:    History reviewed. No pertinent family history.    Social History:  Marital Status:   [2]  Social History     Tobacco Use    Smoking status: Never    Smokeless tobacco: Never   Substance Use Topics    Alcohol use: Yes     Comment: 6-7 per week    Drug use: No        Medications:    ezetimibe (ZETIA) 10 MG tablet  famotidine (PEPCID) 20 MG tablet  sildenafil (VIAGRA) 50 MG tablet  ASPIRIN PO  LORazepam (ATIVAN) 1 MG tablet  meclizine (ANTIVERT) 25 MG tablet  omega-3 acid ethyl esters (LOVAZA) 1 G capsule  Ramipril (ALTACE PO)  Rosuvastatin Calcium (CRESTOR PO)  VITAMIN D, CHOLECALCIFEROL, PO          Review of Systems   Constitutional:  Negative for chills and fever.   HENT:  Positive for rhinorrhea. Negative for congestion, facial swelling, postnasal drip and sore throat.    Respiratory:  Negative for cough and shortness of breath.    All other systems reviewed and are negative.      Physical Exam   BP: 164/84  Pulse: 58  Temp: 97.9  F (36.6  C)  Resp: 16  Height: 166.4 cm (5' 5.5\")  Weight: 61 kg (134 lb 6.4 oz)  SpO2: 100 %      Physical Exam  Vitals and nursing note reviewed.   Constitutional:       Appearance: Normal appearance. He is not ill-appearing or " toxic-appearing.   HENT:      Head: Atraumatic.      Jaw: No trismus, tenderness, swelling, pain on movement or malocclusion.        Comments: Tenderness over the right cheek area.  No skin erythema.  No significant swelling appreciated.  No palpable abscess.     Right Ear: Tympanic membrane and ear canal normal.      Left Ear: Tympanic membrane and ear canal normal.      Nose: Nose normal. No congestion or rhinorrhea.      Mouth/Throat:      Mouth: Mucous membranes are moist. No oral lesions or angioedema.      Dentition: Normal dentition. Does not have dentures. No dental tenderness, gingival swelling, dental caries or dental abscesses.      Tongue: No lesions.      Pharynx: Oropharynx is clear. Uvula midline.      Tonsils: No tonsillar exudate or tonsillar abscesses.        Comments: Patient points to posterior right upper gum area as source of some discomfort. No appreciable gingival swelling or erythema to this area.  No palpable abscess.      Eyes:      Pupils: Pupils are equal, round, and reactive to light.   Cardiovascular:      Rate and Rhythm: Normal rate and regular rhythm.      Heart sounds: Normal heart sounds.   Pulmonary:      Effort: Pulmonary effort is normal. No respiratory distress.      Breath sounds: Normal breath sounds. No wheezing or rhonchi.   Musculoskeletal:         General: Normal range of motion.      Cervical back: Neck supple.   Skin:     General: Skin is warm and dry.      Capillary Refill: Capillary refill takes less than 2 seconds.   Neurological:      Mental Status: He is alert and oriented to person, place, and time.         ED Course        Procedures         Results for orders placed or performed during the hospital encounter of 08/01/24 (from the past 24 hour(s))   CT Facial Bones without Contrast    Narrative    CT FACIAL BONES WITHOUT CONTRAST    HISTORY: pain and tenderness over right cheek, feels like it is  related to posterior molar but no ginigival swelling or  palpable  abscess, no dental pain; at times feels like jaw is shifting,    TECHNIQUE: Contiguous axial images through the facial bones were  performed without contrast.  Soft tissue and bone algorithms were  obtained. The images were reformatted in the sagittal and coronal  plane. This CT exam was performed using one or more the following dose  reduction techniques: automated exposure control, adjustment of the mA  and/or kV according to patient size, and/or iterative reconstruction  technique.    COMPARISON: None.    FINDINGS:  No acute facial bone fracture or dislocation is identified.   No orbital fracture is identified.  The globes are intact.  There is  no evidence of intraorbital hematoma or stranding.    The temporomandibular joints are intact. Mild paranasal sinus mucosal  thickening is seen.    Dental amalgam mildly limits assessment. No definite periapical  disease is seen.      Impression    IMPRESSION:    Multifocal dental amalgam without evidence of significant periapical  disease.     Symmetric appearance of the TMJs.     Consider dental follow-up.    BAYLEE FELIPE MD         SYSTEM ID:  [id number]       Medications - No data to display    Assessments & Plan (with Medical Decision Making)   74-year-old male with right-sided cheek pain and also feeling like it involves his posterior upper gums that presented for evaluation.  Reports occasionally feels like his jaw is dislocated.  No significant findings consistent with TMJ appreciated on physical exam.  No findings consistent with obvious dental abscess.  CT scan that was taken was negative for obvious periapical disease.  Some mild sinus mucosal thickening was appreciated on CT.    I discussed findings with patient.  Reports he is getting relief with Chery pot.  He was therefore advised to continue with Chery pot.  Tylenol or ibuprofen as needed for pain.  Patient  lives in [facility] and plans to be in this area for probably the next 1  to 2 weeks.  He will return to urgent care emergency room and for any worsening or concerning symptoms.  He was advised to follow-up with his dentist for evaluation of his teeth or his primary doctor for persistent symptoms.  Patient voiced understanding.    I have reviewed the nursing notes.    I have reviewed the findings, diagnosis, plan and need for follow up with the patient.  This document was prepared using a combination of typing and voice generated software.  While every attempt was made for accuracy, spelling and grammatical errors may exist.         New Prescriptions    No medications on file       Final diagnoses:   Facial pain   Sinus mucosal thickening       8/1/2024   [facility]       Mpofu, Prudence, CNP  08/01/24 [id number]

## 2024-08-01 NOTE — DISCHARGE INSTRUCTIONS
No obvious dental abscess was appreciated.  You do have some inflammation around your sinuses.    Continue using the Chery pot which she can do up to 2-3 times a day if needed.  Tylenol or ibuprofen as needed for pain.    I recommend following up with your dentist for evaluation of your teeth.  Follow-up with your doctor if your symptoms persist.    Return to urgent care or emergency room for any worsening or concerning symptoms.

## 2024-08-01 NOTE — ED TRIAGE NOTES
Pt presents with c/o pain in face. Unsure if it is dental pain or sinus. States his jaw feels off center and can't close his mouth all the way. Sx started Monday. No otc meds.